# Patient Record
Sex: FEMALE | Race: WHITE
[De-identification: names, ages, dates, MRNs, and addresses within clinical notes are randomized per-mention and may not be internally consistent; named-entity substitution may affect disease eponyms.]

---

## 2020-02-21 ENCOUNTER — HOSPITAL ENCOUNTER (OUTPATIENT)
Dept: HOSPITAL 88 - OR | Age: 60
Discharge: HOME | End: 2020-02-21
Attending: INTERNAL MEDICINE
Payer: COMMERCIAL

## 2020-02-21 VITALS — SYSTOLIC BLOOD PRESSURE: 101 MMHG | DIASTOLIC BLOOD PRESSURE: 60 MMHG

## 2020-02-21 DIAGNOSIS — K64.8: ICD-10-CM

## 2020-02-21 DIAGNOSIS — J44.9: ICD-10-CM

## 2020-02-21 DIAGNOSIS — F17.200: ICD-10-CM

## 2020-02-21 DIAGNOSIS — D50.0: Primary | ICD-10-CM

## 2020-02-21 DIAGNOSIS — K44.9: ICD-10-CM

## 2020-02-21 DIAGNOSIS — K29.50: ICD-10-CM

## 2020-02-21 DIAGNOSIS — K21.9: ICD-10-CM

## 2020-02-21 DIAGNOSIS — R00.0: ICD-10-CM

## 2020-02-21 DIAGNOSIS — K57.30: ICD-10-CM

## 2020-02-21 DIAGNOSIS — I10: ICD-10-CM

## 2020-02-21 LAB
BASOPHILS # BLD AUTO: 0.1 10*3/UL (ref 0–0.1)
BASOPHILS NFR BLD AUTO: 0.6 % (ref 0–1)
DEPRECATED NEUTROPHILS # BLD AUTO: 11.2 10*3/UL (ref 2.1–6.9)
EOSINOPHIL # BLD AUTO: 0.1 10*3/UL (ref 0–0.4)
EOSINOPHIL NFR BLD AUTO: 0.7 % (ref 0–6)
ERYTHROCYTE [DISTWIDTH] IN CORD BLOOD: 15.3 % (ref 11.7–14.4)
HCT VFR BLD AUTO: 26.1 % (ref 34.2–44.1)
HGB BLD-MCNC: 8.1 G/DL (ref 12–16)
LYMPHOCYTES # BLD: 1.5 10*3/UL (ref 1–3.2)
LYMPHOCYTES NFR BLD AUTO: 10 % (ref 18–39.1)
LYMPHOCYTES NFR BLD MANUAL: 7 % (ref 19–48)
MCH RBC QN AUTO: 24 PG (ref 28–32)
MCHC RBC AUTO-ENTMCNC: 31 G/DL (ref 31–35)
MCV RBC AUTO: 77.4 FL (ref 81–99)
MONOCYTES # BLD AUTO: 1.9 10*3/UL (ref 0.2–0.8)
MONOCYTES NFR BLD AUTO: 13 % (ref 4.4–11.3)
MONOCYTES NFR BLD MANUAL: 13 % (ref 3.4–9)
NEUTS SEG NFR BLD AUTO: 75 % (ref 38.7–80)
NEUTS SEG NFR BLD MANUAL: 80 % (ref 40–74)
PLAT MORPH BLD: NORMAL
PLATELET # BLD AUTO: 666 X10E3/UL (ref 140–360)
PLATELET # BLD EST: (no result) 10*3/UL
RBC # BLD AUTO: 3.37 X10E6/UL (ref 3.6–5.1)
RBC MORPH BLD: NORMAL

## 2020-02-21 PROCEDURE — 43239 EGD BIOPSY SINGLE/MULTIPLE: CPT

## 2020-02-21 PROCEDURE — 36415 COLL VENOUS BLD VENIPUNCTURE: CPT

## 2020-02-21 PROCEDURE — 85025 COMPLETE CBC W/AUTO DIFF WBC: CPT

## 2020-02-21 PROCEDURE — 45378 DIAGNOSTIC COLONOSCOPY: CPT

## 2020-02-21 NOTE — XMS REPORT
Patient Summary Document

                             Created on: 2020



JILL SIDHU

External Reference #: [id number]

: 1960

Sex: Female



Demographics







                          Address                   -

[place] [address]

 

                          Home Phone                [phone]

 

                          Preferred Language        Unknown

 

                          Marital Status            Unknown

 

                          Adventism Affiliation     Unknown

 

                          Race                      Unknown

 

                          Ethnic Group              Unknown





Author







                          Author                    [facility]

 

                          [facility]

 

                          Address                   Unknown

 

                          Phone                     Unavailable







Support







                Name            Relationship    Address         Phone

 

                    ADILSON HUGHES      PRS                 [address] 

[place], [place]  [id number] [phone]

 

                    ADILSON HUGHES      PRS                 [address] 

[place], [place]  [address] [phone]

 

                    UNKN,  UNKN         PRS                 [address] 

[place], [place]  [address] [phone]







Care Team Providers







                    Care Team Member Name    Role                Phone

 

                    TIMOTHY ENGLAND    Unavailable         Unavailable







Payers







             Payer Name    Policy Type    Policy Number    Effective Date    Expiration Date







Problems

This patient has no known problems.



Allergies, Adverse Reactions, Alerts







          Allergy Name    Allergy Type    Status    Severity    Reaction(s)    Onset Date    Inactive 

Date                      Treating Clinician        Comments

 

        No Known Allergies    DA      Active    U               2018 00:00:00                     







Medications

This patient has no known medications.



Results







           Test Description    Test Time    Test Comments    Text Results    Atomic Results    Result

 Comments

 

                - XR NECK SOFT TISSUE    2019 12:38:00                     FAX: Timothy Guillaume MD

  [phone]    [place]:   St: [facility] FAX: Alyssa Ford MD  
[phone]   
------------------------------------------------------------------------------- 
Name:   JILL NICOLAS             [facility]                    :
1960  Age/S: 58/F           [address] [place] [address] Blvd         Unit #: 
[id number]      Loc: [place] [address]                 Phys: 
Alyssa Peck MD                                                 Acct: 
[id number] Dis Date:               Status: REG CLI                            
   PHONE #: [phone]     Exam Date:     2019                 
 FAX #: [phone]     Reason: M19.90. INFLAMMATORY ARTHRITIS.                
   EXAMS:                                               CPT CODE:      [id number]
XR NECK SOFT TISSUE                        35222                    Soft tissue 
neck 2 view               HISTORY: Inflammatory arthritis.  Pulmonary nodule.   
           No relevant comparison.               FINDINGS:               The 
epiglottis and aryepiglottic folds are normal.  There is no       prevertebral 
soft tissue swelling.  There is extensive degenerative       disc and 
degenerative facet change at the mid to low cervical spine.        Chronic 
findings at the lung apices are described on separate CT       chest.           
     IMPRESSION:         1.  No acute soft tissue neck abnormality.         2.  
Extensive mid to low cervical degenerative disc and degenerative         facet 
change.  This may reflect inflammatory arthritis given history.         3.  Lung
findings are described on separate CT chest.                   SL:01            
     ** Electronically Signed by MARIAN Fritz **          **          
   on 2019 at 1238              **                      Reported and 
signed by: Jostin Fritz M.D.                 CC: Timothy England MD; 
Alyssa Peck MD                                                              
                                   Technologist: RT Jaylin(MINOR)          
                       Trnscrd Date/Time/By: 2019 (1238) : By: Kristin   
        Orig Print D/T: S: 2019 (5884)                         PAGE  1    
                  Signed Report                                    

 

                - CT CHEST W/O CONTRAST    2019 12:22:00                      Name: JILL NICOLAS   

           [facility]                    : 1960 Age/S: 58  / F    
    [address] [place] [address]         Unit #: [id number]     Loc:               
[place] [address]                 Phys: Alyssa Peck MD                     
                           Acct: [id number]  Dis Date:               Status: 
REG CLI                                 PHONE #: [phone]     Exam Date: 
2019  09                     FAX #: [phone]      Reason: R91.1, 
SOLITARY PULMONARY NODULE.                   EXAMS:                             
                 CPT CODE:      [id number] CT CHEST W/O CONTRAST                 
    [address]                    PROCEDURE: CT CHEST WITHOUT CONTRAST               
INDICATION: Solitary pulmonary nodule.               COMPARISON: No relevant 
priors available.               TECHNIQUE: Helical imaging performed apices 
through the lung bases       with axial, sagittal and coronal reformations.     
 CT imaging performed at this location utilizes radiation dose       
optimization techniques which include one or more of the following:       -
Automated exposure control       -Adjustment of the mA and/or kV according to 
patient size       -Use of iterative reconstruction technique       CT Radiation
Dose .20 mGy-cm                IV CONTRAST: None.               FINDINGS:
       LUNGS: Interstitial process with areas of consolidation and associated   
   volume loss involving the majority of the left upper lobe.  Areas of       
cavitation are noted within the lung parenchyma left upper lobe.        
Subpleural opacity left lower lobe 10 mm in diameter.  This is on       axial 
image 154 series 3.  A few much smaller opacities 2 to 3 mm in       diameter 
noted in the superior segment left lower lobe.  Emphysematous       changes 
noted throughout the right upper lobe and a small amount of       chronic 
appearing fibrocalcific scarring anteriorly right upper lobe.        No pleural 
effusions.               MEDIASTINUM: There are a few very small 5 to 6 mm or 
less mediastinal       nodes.  Thoracic aorta normal in caliber.  Central airway
clear.               UPPER ABDOMEN: Visualized upper abdominal viscera are fariba
l.               MUSCULOSKELETAL: Diffuse osteopenia.  Bones intact.            
    IMPRESSION:          1.  Chronic appearing changes involving the majority of
the left upper         lobe with associated volume loss.  These are most likely 
       postinflammatory.  Less pronounced changes right upper lobe.         2.  
10 mm subpleural opacity left lower lobe laterally.  I have no         prior 
exams to establish stability.  Recommend comparison to prior         exams from 
another facility.         3.  Emphysematous change right upper lobe.         4. 
Osteopenia.               PAGE  1                       Signed Report           
        (CONTINUED)   Name: JILL NICOLAS              [facility]   
                : 1960 Age/S: 58  / F         [address] [place] [address]  
      Unit #: [id number]     Loc:               [place] [address]               
 Phys: Alyssa Peck MD                                                 Acct:
[id number]  Dis Date:               Status: REG CLI                           
     PHONE #: [phone]     Exam Date: 2019  09                     
FAX #: [phone]      Reason: R91.1, SOLITARY PULMONARY NODULE.              
    EXAMS:                                               CPT CODE:      
[id number] CT CHEST W/O CONTRAST                      [address]               
<Continued>                    *****END IMPRESSION*****                   
[id number]                                                           ** 
Electronically Signed by MARIAN Vásquez **           **             on 
2019 at 1222             **                      Reported and signed by: 
Suhail Vásquez M.D.                             CC: Timothy England MD; Alyssa Peck MD                                                                     
                            Technologist:Don Rob, RT(R); Sammy  CTDI:  
     DLP:        Trnscb Date/Time: 2019 (122) t.SDR.RTB                  
     Orig Print D/T: S: 2019 (7271)      PAGE  2                       
Signed Report                                    

 

                - XR SHOULDER 2 + V BI    2019 11:04:00                     FAX: Timothy Guillaume MD

  [phone]    [place]:   St: [facility] FAX: Alyssa Ford MD  
[phone]   
------------------------------------------------------------------------------- 
Name:   JILL NICOLAS             [facility]                    :
1960  Age/S: 58/F           [address] [place] [address] Blvd         Unit #: 
[id number]      Loc: [place] [address]                 Phys: 
Alyssa Peck MD                                                 Acct: 
[id number] Dis Date:               Status: REG CLI                            
   PHONE #: [phone]     Exam Date:     2019     0948                 
 FAX #: [phone]     Reason: M25.519 , SHOULDER PAIN.                       
   EXAMS:                                               CPT CODE:      [id number]
XR SHOULDER 2 + V BI                       14188                    BILATERAL 
SHOULDERS, 3 VIEWS EACH 2019               COMPARISON: None               
CLINICAL HISTORY: M25.519 , SHOULDER PAIN.               FINDINGS: No acute 
fracture or dislocation is noted.               No lytic or blastic lesion is 
seen.                No significant joint space narrowing noted.               
No abnormal soft tissue calcifications are seen within either       shoulder.   
           A few calcifications are seen in the region of the right carotid     
 artery.               Abnormal opacities with cystic changes are present in the
left upper       lobe with associated left apical pleural thickening. Minor 
right upper       lobe pleural and parenchymal scarring noted.               CO
NCLUSION: No acute osseous abnormality is seen in the shoulders.               
Abnormal left upper lobe opacities/cystic changes. Consider CT chest       for 
further assessment.          ** Electronically Signed by MARIAN Fleming on 
2019 at 1104 **                      Reported and signed by: Obed Fleming M.D.             CC: Timothy England MD; Alyssa Peck MD                     
                                                                            
Technologist: KARINA Mosqueda)                                  Trnscrd 
Date/Time/By: 2019 (9934) : By: [email or web address]          Orig Print D/T: S: 
2019 (3865)                         PAGE  1                       Signed 
Report                                    

 

                US RENAL DOPPLER LTD                                        [place] [address]      Patient Name: JILL EDUARDO   MR #: [id number]    : 1960 Age/Sex: 57/F  Acct #: [id number]
[id number] Req #: [id number]  Adm Physician:     Ordered by: TIMOTHY ENGLAND MD  Report #: [id number]   Location: US  Room/Bed:     
_____________________________________________________________________
______________________________    Procedure: [id number] US/US RENAL DOPPLER LTD  
Exam Date:                             Exam Time:        REPORT STATUS: Signed  
 PROCEDURE: RENAL DOPPLER ULTRASOUND       COMPARISON:   [facility], CT, CT CHEST WO, 2017, 8:19.     [facility], CT, 
OUTSIDE CT IMAGES, 2016, 9:46.       INDICATIONS:   HTN NOT CONTROLLED     
 FINDINGS:      Multiple sagittal and axial images of the right and left kidneys
were    obtained.       RIGHT KIDNEY:   The right kidney measures 9.0 cm. The 
cortical thickness is 1.1 cm.   The right kidney has normal echogenicity. There 
are no masses,    hydronephrosis or calculi.       The right renal artery PSV is
91.1, 33.8, 54.3 and 47.4 cm/sec at the    ostium, proximal, mid, and distal 
aspects, respectively.   The right segmental artery PSV is 44.7, 61.1 and 33.3 
in the superior,    mid, and inferior aspects, respectively cm/sec.       LEFT 
KIDNEY:   The left kidney measures 9.5 cm. The cortical thickness is 1.8 cm.   
The left kidney has normal echogenicity. There are no masses,    hydronephrosis 
or calculi.       The left renal artery PSV is 78.8, 42.4, 54.7 and 46.5 cm/sec 
at the    ostium, proximal, mid, and distal aspects, respectively.   The left 
segmental artery PSV is 31.5, 41.0 and 44.7 cm/sec at the    superior, mid, and 
inferior aspects, respectively.       The abdominal aorta PSV is 53.2, 39.7 and 
37.3 cm/sec at the proximal,    celiac/SMA and distal aspects of the 
respectively.   The right renal artery/aorta ratio is 1.7.   The left renal 
artery/aorta ratio is 1.5.       The right and left renal veins are patent.     
 The bladder is unremarkable. No focal lesions. Bilateral ureteral jets    are 
identified.       CONCLUSION:          1. Essentially unremarkable renal 
ultrasound.       2. No sonographic evidence for renal artery stenosis.         
 Shashi Espinal M.D.     Dictated by:  Shashi Espinal M.D. on 2017
at 14:24        Electronically approved by:  Shashi Espinal M.D. on 
2017 at    14:24                Dictated By: SHASHI ESPINAL MD  
Electronically Signed By: SHASHI ESPINAL MD on 17 142  Transcribed By: 
AMARA on 17 1424       COPY TO:   TIMOTHY ENGLAND MD             

 

                US RENAL RETROPERITONEAL COMP                                        [place] [address]      Patient Name: JILL EDUARDO   MR #: [id number]    : 1960 Age/Sex: 57/F  Acct #: [id number]
[id number] Req #: [id number]  Adm Physician:     Ordered by: TIMOTHY ENGLAND MD  Report #: [id number]   Location: US  Room/Bed:     
_____________________________________________________________________
______________________________    Procedure: [id number] US/US RENAL 
RETROPERITONEAL COMP  Exam Date:                             Exam Time:        
REPORT STATUS: Signed    PROCEDURE:   US RETROPERITONEAL ( KIDNEY ).   
COMPARISON:   [facility], CT, CT CHEST WO, 2017, 8:19.   
INDICATIONS:   HTN NOT CONTROLLED       CONCLUSION:      Please see previously 
dictated report under renal Doppler ultrasound    performed same date           
   Shashi Espinal M.D.     Dictated by:  Shashi Espinal M.D. on 
2017 at 14:24        Electronically approved by:  Shashi Espinal M.D. 
on 2017 at    14:24                Dictated By: SHASHI ESPINAL MD  
Electronically Signed By: SHASHI ESPINAL MD on 17 1424  Transcribed By: 
AMARA on 17       COPY TO:   TIMOTHY ENGLAND MD

## 2020-11-16 ENCOUNTER — HOSPITAL ENCOUNTER (INPATIENT)
Dept: HOSPITAL 88 - ER | Age: 60
LOS: 10 days | DRG: 640 | End: 2020-11-26
Attending: FAMILY MEDICINE | Admitting: FAMILY MEDICINE
Payer: COMMERCIAL

## 2020-11-16 VITALS — DIASTOLIC BLOOD PRESSURE: 54 MMHG | SYSTOLIC BLOOD PRESSURE: 106 MMHG

## 2020-11-16 VITALS — SYSTOLIC BLOOD PRESSURE: 106 MMHG | DIASTOLIC BLOOD PRESSURE: 54 MMHG

## 2020-11-16 VITALS — HEIGHT: 60 IN | WEIGHT: 69.44 LBS | BODY MASS INDEX: 13.63 KG/M2

## 2020-11-16 DIAGNOSIS — J96.21: ICD-10-CM

## 2020-11-16 DIAGNOSIS — R64: ICD-10-CM

## 2020-11-16 DIAGNOSIS — Z51.5: ICD-10-CM

## 2020-11-16 DIAGNOSIS — J44.0: ICD-10-CM

## 2020-11-16 DIAGNOSIS — K20.90: ICD-10-CM

## 2020-11-16 DIAGNOSIS — M89.49: ICD-10-CM

## 2020-11-16 DIAGNOSIS — J96.22: ICD-10-CM

## 2020-11-16 DIAGNOSIS — Z20.828: ICD-10-CM

## 2020-11-16 DIAGNOSIS — E87.6: ICD-10-CM

## 2020-11-16 DIAGNOSIS — J69.0: ICD-10-CM

## 2020-11-16 DIAGNOSIS — I46.9: ICD-10-CM

## 2020-11-16 DIAGNOSIS — E43: Primary | ICD-10-CM

## 2020-11-16 DIAGNOSIS — K94.29: ICD-10-CM

## 2020-11-16 DIAGNOSIS — A31.9: ICD-10-CM

## 2020-11-16 DIAGNOSIS — D62: ICD-10-CM

## 2020-11-16 DIAGNOSIS — J15.9: ICD-10-CM

## 2020-11-16 DIAGNOSIS — R62.7: ICD-10-CM

## 2020-11-16 LAB
ALBUMIN SERPL-MCNC: 3 G/DL (ref 3.5–5)
ALBUMIN/GLOB SERPL: 0.8 {RATIO} (ref 0.8–2)
ALP SERPL-CCNC: 85 IU/L (ref 40–150)
ALT SERPL-CCNC: 7 IU/L (ref 0–55)
ANION GAP SERPL CALC-SCNC: 11.2 MMOL/L (ref 8–16)
BASOPHILS # BLD AUTO: 0 10*3/UL (ref 0–0.1)
BASOPHILS NFR BLD AUTO: 0.5 % (ref 0–1)
BUN SERPL-MCNC: 9 MG/DL (ref 7–26)
BUN/CREAT SERPL: 15 (ref 6–25)
CALCIUM SERPL-MCNC: 8.9 MG/DL (ref 8.4–10.2)
CHLORIDE SERPL-SCNC: 88 MMOL/L (ref 98–107)
CO2 SERPL-SCNC: 27 MMOL/L (ref 22–29)
DEPRECATED APTT PLAS QN: 46.9 SECONDS (ref 23.8–35.5)
DEPRECATED INR PLAS: 1.05
DEPRECATED NEUTROPHILS # BLD AUTO: 5 10*3/UL (ref 2.1–6.9)
EGFRCR SERPLBLD CKD-EPI 2021: > 60 ML/MIN (ref 60–?)
EOSINOPHIL # BLD AUTO: 0.1 10*3/UL (ref 0–0.4)
EOSINOPHIL NFR BLD AUTO: 1.1 % (ref 0–6)
ERYTHROCYTE [DISTWIDTH] IN CORD BLOOD: 15 % (ref 11.7–14.4)
GLOBULIN PLAS-MCNC: 3.8 G/DL (ref 2.3–3.5)
GLUCOSE SERPLBLD-MCNC: 89 MG/DL (ref 74–118)
HCT VFR BLD AUTO: 30.2 % (ref 34.2–44.1)
HGB BLD-MCNC: 9.4 G/DL (ref 12–16)
LYMPHOCYTES # BLD: 1.6 10*3/UL (ref 1–3.2)
LYMPHOCYTES NFR BLD AUTO: 21.3 % (ref 18–39.1)
MAGNESIUM SERPL-MCNC: 1.9 MG/DL (ref 1.3–2.1)
MCH RBC QN AUTO: 24 PG (ref 28–32)
MCHC RBC AUTO-ENTMCNC: 31.1 G/DL (ref 31–35)
MCV RBC AUTO: 77.2 FL (ref 81–99)
MONOCYTES # BLD AUTO: 0.7 10*3/UL (ref 0.2–0.8)
MONOCYTES NFR BLD AUTO: 9.6 % (ref 4.4–11.3)
NEUTS SEG NFR BLD AUTO: 67.2 % (ref 38.7–80)
PLATELET # BLD AUTO: 248 X10E3/UL (ref 140–360)
POTASSIUM SERPL-SCNC: 4.2 MMOL/L (ref 3.5–5.1)
PROTHROMBIN TIME: 14.2 SECONDS (ref 11.9–14.5)
RBC # BLD AUTO: 3.91 X10E6/UL (ref 3.6–5.1)
SODIUM SERPL-SCNC: 122 MMOL/L (ref 136–145)

## 2020-11-16 PROCEDURE — 84466 ASSAY OF TRANSFERRIN: CPT

## 2020-11-16 PROCEDURE — 36569 INSJ PICC 5 YR+ W/O IMAGING: CPT

## 2020-11-16 PROCEDURE — 86920 COMPATIBILITY TEST SPIN: CPT

## 2020-11-16 PROCEDURE — 74177 CT ABD & PELVIS W/CONTRAST: CPT

## 2020-11-16 PROCEDURE — 92950 HEART/LUNG RESUSCITATION CPR: CPT

## 2020-11-16 PROCEDURE — 85025 COMPLETE CBC W/AUTO DIFF WBC: CPT

## 2020-11-16 PROCEDURE — 85730 THROMBOPLASTIN TIME PARTIAL: CPT

## 2020-11-16 PROCEDURE — 82948 REAGENT STRIP/BLOOD GLUCOSE: CPT

## 2020-11-16 PROCEDURE — 87040 BLOOD CULTURE FOR BACTERIA: CPT

## 2020-11-16 PROCEDURE — 94660 CPAP INITIATION&MGMT: CPT

## 2020-11-16 PROCEDURE — 43246 EGD PLACE GASTROSTOMY TUBE: CPT

## 2020-11-16 PROCEDURE — 84100 ASSAY OF PHOSPHORUS: CPT

## 2020-11-16 PROCEDURE — 43239 EGD BIOPSY SINGLE/MULTIPLE: CPT

## 2020-11-16 PROCEDURE — 86900 BLOOD TYPING SEROLOGIC ABO: CPT

## 2020-11-16 PROCEDURE — 87206 SMEAR FLUORESCENT/ACID STAI: CPT

## 2020-11-16 PROCEDURE — 36600 WITHDRAWAL OF ARTERIAL BLOOD: CPT

## 2020-11-16 PROCEDURE — 82805 BLOOD GASES W/O2 SATURATION: CPT

## 2020-11-16 PROCEDURE — 85379 FIBRIN DEGRADATION QUANT: CPT

## 2020-11-16 PROCEDURE — 85610 PROTHROMBIN TIME: CPT

## 2020-11-16 PROCEDURE — 83605 ASSAY OF LACTIC ACID: CPT

## 2020-11-16 PROCEDURE — 71260 CT THORAX DX C+: CPT

## 2020-11-16 PROCEDURE — 82607 VITAMIN B-12: CPT

## 2020-11-16 PROCEDURE — 83540 ASSAY OF IRON: CPT

## 2020-11-16 PROCEDURE — 71045 X-RAY EXAM CHEST 1 VIEW: CPT

## 2020-11-16 PROCEDURE — 94003 VENT MGMT INPAT SUBQ DAY: CPT

## 2020-11-16 PROCEDURE — 87493 C DIFF AMPLIFIED PROBE: CPT

## 2020-11-16 PROCEDURE — 84484 ASSAY OF TROPONIN QUANT: CPT

## 2020-11-16 PROCEDURE — 85045 AUTOMATED RETICULOCYTE COUNT: CPT

## 2020-11-16 PROCEDURE — 94640 AIRWAY INHALATION TREATMENT: CPT

## 2020-11-16 PROCEDURE — 94002 VENT MGMT INPAT INIT DAY: CPT

## 2020-11-16 PROCEDURE — 99251: CPT

## 2020-11-16 PROCEDURE — 97139 UNLISTED THERAPEUTIC PX: CPT

## 2020-11-16 PROCEDURE — 93306 TTE W/DOPPLER COMPLETE: CPT

## 2020-11-16 PROCEDURE — 94664 DEMO&/EVAL PT USE INHALER: CPT

## 2020-11-16 PROCEDURE — 80048 BASIC METABOLIC PNL TOTAL CA: CPT

## 2020-11-16 PROCEDURE — 82746 ASSAY OF FOLIC ACID SERUM: CPT

## 2020-11-16 PROCEDURE — 86850 RBC ANTIBODY SCREEN: CPT

## 2020-11-16 PROCEDURE — 81001 URINALYSIS AUTO W/SCOPE: CPT

## 2020-11-16 PROCEDURE — 82728 ASSAY OF FERRITIN: CPT

## 2020-11-16 PROCEDURE — 87116 MYCOBACTERIA CULTURE: CPT

## 2020-11-16 PROCEDURE — 36415 COLL VENOUS BLD VENIPUNCTURE: CPT

## 2020-11-16 PROCEDURE — 82553 CREATINE MB FRACTION: CPT

## 2020-11-16 PROCEDURE — 74018 RADEX ABDOMEN 1 VIEW: CPT

## 2020-11-16 PROCEDURE — 31500 INSERT EMERGENCY AIRWAY: CPT

## 2020-11-16 PROCEDURE — 80053 COMPREHEN METABOLIC PANEL: CPT

## 2020-11-16 PROCEDURE — 88305 TISSUE EXAM BY PATHOLOGIST: CPT

## 2020-11-16 PROCEDURE — 82550 ASSAY OF CK (CPK): CPT

## 2020-11-16 PROCEDURE — 83735 ASSAY OF MAGNESIUM: CPT

## 2020-11-16 PROCEDURE — 99284 EMERGENCY DEPT VISIT MOD MDM: CPT

## 2020-11-16 RX ADMIN — SODIUM CHLORIDE SCH MLS/HR: 9 INJECTION, SOLUTION INTRAVENOUS at 22:03

## 2020-11-16 RX ADMIN — CEFTRIAXONE SCH MLS/HR: 100 INJECTION, POWDER, FOR SOLUTION INTRAVENOUS at 20:13

## 2020-11-17 VITALS — DIASTOLIC BLOOD PRESSURE: 71 MMHG | SYSTOLIC BLOOD PRESSURE: 116 MMHG

## 2020-11-17 VITALS — SYSTOLIC BLOOD PRESSURE: 105 MMHG | DIASTOLIC BLOOD PRESSURE: 84 MMHG

## 2020-11-17 VITALS — SYSTOLIC BLOOD PRESSURE: 107 MMHG | DIASTOLIC BLOOD PRESSURE: 66 MMHG

## 2020-11-17 VITALS — SYSTOLIC BLOOD PRESSURE: 113 MMHG | DIASTOLIC BLOOD PRESSURE: 77 MMHG

## 2020-11-17 VITALS — DIASTOLIC BLOOD PRESSURE: 70 MMHG | SYSTOLIC BLOOD PRESSURE: 128 MMHG

## 2020-11-17 VITALS — DIASTOLIC BLOOD PRESSURE: 71 MMHG | SYSTOLIC BLOOD PRESSURE: 98 MMHG

## 2020-11-17 VITALS — DIASTOLIC BLOOD PRESSURE: 59 MMHG | SYSTOLIC BLOOD PRESSURE: 109 MMHG

## 2020-11-17 LAB
ALBUMIN SERPL-MCNC: 2.5 G/DL (ref 3.5–5)
ALBUMIN/GLOB SERPL: 0.8 {RATIO} (ref 0.8–2)
ALP SERPL-CCNC: 72 IU/L (ref 40–150)
ALT SERPL-CCNC: < 6 IU/L (ref 0–55)
ANION GAP SERPL CALC-SCNC: 10.8 MMOL/L (ref 8–16)
ANION GAP SERPL CALC-SCNC: 8.8 MMOL/L (ref 8–16)
BACTERIA URNS QL MICRO: (no result) /HPF
BASOPHILS # BLD AUTO: 0 10*3/UL (ref 0–0.1)
BASOPHILS # BLD AUTO: 0 10*3/UL (ref 0–0.1)
BASOPHILS NFR BLD AUTO: 0.1 % (ref 0–1)
BASOPHILS NFR BLD AUTO: 0.4 % (ref 0–1)
BILIRUB UR QL: NEGATIVE
BUN SERPL-MCNC: 10 MG/DL (ref 7–26)
BUN SERPL-MCNC: 8 MG/DL (ref 7–26)
BUN/CREAT SERPL: 16 (ref 6–25)
BUN/CREAT SERPL: 19 (ref 6–25)
CALCIUM SERPL-MCNC: 7.4 MG/DL (ref 8.4–10.2)
CALCIUM SERPL-MCNC: 8 MG/DL (ref 8.4–10.2)
CHLORIDE SERPL-SCNC: 108 MMOL/L (ref 98–107)
CHLORIDE SERPL-SCNC: 98 MMOL/L (ref 98–107)
CLARITY UR: CLEAR
CO2 BLDCOA CALC-SCNC: > 5 MMOL/L
CO2 SERPL-SCNC: 22 MMOL/L (ref 22–29)
CO2 SERPL-SCNC: 26 MMOL/L (ref 22–29)
COLOR UR: YELLOW
DEPRECATED NEUTROPHILS # BLD AUTO: 3.7 10*3/UL (ref 2.1–6.9)
DEPRECATED NEUTROPHILS # BLD AUTO: 6.9 10*3/UL (ref 2.1–6.9)
DEPRECATED RBC URNS MANUAL-ACNC: (no result) /HPF (ref 0–5)
EGFRCR SERPLBLD CKD-EPI 2021: > 60 ML/MIN (ref 60–?)
EGFRCR SERPLBLD CKD-EPI 2021: > 60 ML/MIN (ref 60–?)
EOSINOPHIL # BLD AUTO: 0 10*3/UL (ref 0–0.4)
EOSINOPHIL # BLD AUTO: 0.1 10*3/UL (ref 0–0.4)
EOSINOPHIL NFR BLD AUTO: 0.2 % (ref 0–6)
EOSINOPHIL NFR BLD AUTO: 1.1 % (ref 0–6)
EPI CELLS URNS QL MICRO: (no result) /LPF
ERYTHROCYTE [DISTWIDTH] IN CORD BLOOD: 14.9 % (ref 11.7–14.4)
ERYTHROCYTE [DISTWIDTH] IN CORD BLOOD: 15.3 % (ref 11.7–14.4)
FERRITIN SERPL-MCNC: 1785.57 NG/ML (ref 4.63–204)
GLOBULIN PLAS-MCNC: 3.3 G/DL (ref 2.3–3.5)
GLUCOSE SERPLBLD-MCNC: 27 MG/DL (ref 74–118)
GLUCOSE SERPLBLD-MCNC: 79 MG/DL (ref 74–118)
HCO3 BLDA-SCNC: 4 MMOL/L (ref 22–26)
HCT VFR BLD AUTO: 24.4 % (ref 34.2–44.1)
HCT VFR BLD AUTO: 26.5 % (ref 34.2–44.1)
HGB BLD-MCNC: 7.4 G/DL (ref 12–16)
HGB BLD-MCNC: 8.3 G/DL (ref 12–16)
IRON SATN MFR SERPL: 11 % (ref 15–50)
IRON SERPL-MCNC: 19 UG/DL (ref 50–170)
KETONES UR QL STRIP.AUTO: NEGATIVE
LEUKOCYTE ESTERASE UR QL STRIP.AUTO: NEGATIVE
LYMPHOCYTES # BLD: 0.7 10*3/UL (ref 1–3.2)
LYMPHOCYTES # BLD: 1.1 10*3/UL (ref 1–3.2)
LYMPHOCYTES NFR BLD AUTO: 19.6 % (ref 18–39.1)
LYMPHOCYTES NFR BLD AUTO: 8.1 % (ref 18–39.1)
MCH RBC QN AUTO: 24.3 PG (ref 28–32)
MCH RBC QN AUTO: 24.5 PG (ref 28–32)
MCHC RBC AUTO-ENTMCNC: 30.3 G/DL (ref 31–35)
MCHC RBC AUTO-ENTMCNC: 31.3 G/DL (ref 31–35)
MCV RBC AUTO: 77.5 FL (ref 81–99)
MCV RBC AUTO: 80.8 FL (ref 81–99)
MONOCYTES # BLD AUTO: 0.5 10*3/UL (ref 0.2–0.8)
MONOCYTES # BLD AUTO: 0.6 10*3/UL (ref 0.2–0.8)
MONOCYTES NFR BLD AUTO: 10.8 % (ref 4.4–11.3)
MONOCYTES NFR BLD AUTO: 5.8 % (ref 4.4–11.3)
NEUTS SEG NFR BLD AUTO: 67.7 % (ref 38.7–80)
NEUTS SEG NFR BLD AUTO: 84.8 % (ref 38.7–80)
NITRITE UR QL STRIP.AUTO: NEGATIVE
PCO2 BLDA: 16 MMHG (ref 35–45)
PCO2 BLDA: 55 MMHG (ref 80–105)
PH BLDA: 6.98 [PH] (ref 7.35–7.45)
PLATELET # BLD AUTO: 149 X10E3/UL (ref 140–360)
PLATELET # BLD AUTO: 215 X10E3/UL (ref 140–360)
POTASSIUM SERPL-SCNC: 3.8 MMOL/L (ref 3.5–5.1)
POTASSIUM SERPL-SCNC: 3.8 MMOL/L (ref 3.5–5.1)
PROT UR QL STRIP.AUTO: NEGATIVE
RBC # BLD AUTO: 3.02 X10E6/UL (ref 3.6–5.1)
RBC # BLD AUTO: 3.42 X10E6/UL (ref 3.6–5.1)
SAO2 % BLDA: 70 % (ref 95–98)
SODIUM SERPL-SCNC: 131 MMOL/L (ref 136–145)
SODIUM SERPL-SCNC: 135 MMOL/L (ref 136–145)
SP GR UR STRIP: 1.02 (ref 1.01–1.02)
TIBC SERPL-MCNC: 168 UG/DL (ref 261–478)
TRANSFERRIN SERPL-MCNC: 120 MG/DL (ref 180–382)
UROBILINOGEN UR STRIP-MCNC: 0.2 MG/DL (ref 0.2–1)
WBC #/AREA URNS HPF: (no result) /HPF (ref 0–5)

## 2020-11-17 PROCEDURE — 5A1935Z RESPIRATORY VENTILATION, LESS THAN 24 CONSECUTIVE HOURS: ICD-10-PCS | Performed by: INTERNAL MEDICINE

## 2020-11-17 PROCEDURE — 0DB98ZX EXCISION OF DUODENUM, VIA NATURAL OR ARTIFICIAL OPENING ENDOSCOPIC, DIAGNOSTIC: ICD-10-PCS | Performed by: INTERNAL MEDICINE

## 2020-11-17 PROCEDURE — 0BH17EZ INSERTION OF ENDOTRACHEAL AIRWAY INTO TRACHEA, VIA NATURAL OR ARTIFICIAL OPENING: ICD-10-PCS | Performed by: INTERNAL MEDICINE

## 2020-11-17 RX ADMIN — Medication PRN MG: at 09:54

## 2020-11-17 RX ADMIN — CEFTRIAXONE SCH MLS/HR: 100 INJECTION, POWDER, FOR SOLUTION INTRAVENOUS at 18:20

## 2020-11-17 RX ADMIN — SODIUM CHLORIDE SCH MLS/HR: 9 INJECTION, SOLUTION INTRAVENOUS at 05:53

## 2020-11-17 RX ADMIN — SODIUM CHLORIDE SCH MLS/HR: 9 INJECTION, SOLUTION INTRAVENOUS at 12:45

## 2020-11-17 RX ADMIN — SODIUM CHLORIDE SCH MG: 900 INJECTION INTRAVENOUS at 10:04

## 2020-11-18 VITALS — DIASTOLIC BLOOD PRESSURE: 63 MMHG | SYSTOLIC BLOOD PRESSURE: 117 MMHG

## 2020-11-18 VITALS — DIASTOLIC BLOOD PRESSURE: 48 MMHG | SYSTOLIC BLOOD PRESSURE: 81 MMHG

## 2020-11-18 VITALS — SYSTOLIC BLOOD PRESSURE: 89 MMHG | DIASTOLIC BLOOD PRESSURE: 61 MMHG

## 2020-11-18 VITALS — SYSTOLIC BLOOD PRESSURE: 71 MMHG | DIASTOLIC BLOOD PRESSURE: 47 MMHG

## 2020-11-18 VITALS — SYSTOLIC BLOOD PRESSURE: 104 MMHG | DIASTOLIC BLOOD PRESSURE: 71 MMHG

## 2020-11-18 VITALS — DIASTOLIC BLOOD PRESSURE: 66 MMHG | SYSTOLIC BLOOD PRESSURE: 114 MMHG

## 2020-11-18 VITALS — DIASTOLIC BLOOD PRESSURE: 58 MMHG | SYSTOLIC BLOOD PRESSURE: 95 MMHG

## 2020-11-18 VITALS — DIASTOLIC BLOOD PRESSURE: 56 MMHG | SYSTOLIC BLOOD PRESSURE: 102 MMHG

## 2020-11-18 VITALS — SYSTOLIC BLOOD PRESSURE: 89 MMHG | DIASTOLIC BLOOD PRESSURE: 67 MMHG

## 2020-11-18 VITALS — SYSTOLIC BLOOD PRESSURE: 117 MMHG | DIASTOLIC BLOOD PRESSURE: 60 MMHG

## 2020-11-18 VITALS — SYSTOLIC BLOOD PRESSURE: 109 MMHG | DIASTOLIC BLOOD PRESSURE: 71 MMHG

## 2020-11-18 VITALS — SYSTOLIC BLOOD PRESSURE: 95 MMHG | DIASTOLIC BLOOD PRESSURE: 58 MMHG

## 2020-11-18 VITALS — DIASTOLIC BLOOD PRESSURE: 64 MMHG | SYSTOLIC BLOOD PRESSURE: 123 MMHG

## 2020-11-18 VITALS — DIASTOLIC BLOOD PRESSURE: 58 MMHG | SYSTOLIC BLOOD PRESSURE: 82 MMHG

## 2020-11-18 VITALS — SYSTOLIC BLOOD PRESSURE: 120 MMHG | DIASTOLIC BLOOD PRESSURE: 77 MMHG

## 2020-11-18 VITALS — DIASTOLIC BLOOD PRESSURE: 68 MMHG | SYSTOLIC BLOOD PRESSURE: 115 MMHG

## 2020-11-18 VITALS — SYSTOLIC BLOOD PRESSURE: 117 MMHG | DIASTOLIC BLOOD PRESSURE: 68 MMHG

## 2020-11-18 VITALS — DIASTOLIC BLOOD PRESSURE: 62 MMHG | SYSTOLIC BLOOD PRESSURE: 120 MMHG

## 2020-11-18 VITALS — DIASTOLIC BLOOD PRESSURE: 61 MMHG | SYSTOLIC BLOOD PRESSURE: 111 MMHG

## 2020-11-18 VITALS — DIASTOLIC BLOOD PRESSURE: 66 MMHG | SYSTOLIC BLOOD PRESSURE: 119 MMHG

## 2020-11-18 VITALS — SYSTOLIC BLOOD PRESSURE: 112 MMHG | DIASTOLIC BLOOD PRESSURE: 67 MMHG

## 2020-11-18 VITALS — SYSTOLIC BLOOD PRESSURE: 113 MMHG | DIASTOLIC BLOOD PRESSURE: 66 MMHG

## 2020-11-18 VITALS — DIASTOLIC BLOOD PRESSURE: 77 MMHG | SYSTOLIC BLOOD PRESSURE: 101 MMHG

## 2020-11-18 VITALS — SYSTOLIC BLOOD PRESSURE: 111 MMHG | DIASTOLIC BLOOD PRESSURE: 70 MMHG

## 2020-11-18 VITALS — DIASTOLIC BLOOD PRESSURE: 63 MMHG | SYSTOLIC BLOOD PRESSURE: 99 MMHG

## 2020-11-18 VITALS — SYSTOLIC BLOOD PRESSURE: 98 MMHG | DIASTOLIC BLOOD PRESSURE: 54 MMHG

## 2020-11-18 LAB
ALBUMIN SERPL-MCNC: 2.7 G/DL (ref 3.5–5)
ALBUMIN/GLOB SERPL: 1.1 {RATIO} (ref 0.8–2)
ALP SERPL-CCNC: 147 IU/L (ref 40–150)
ALT SERPL-CCNC: 29 IU/L (ref 0–55)
ANION GAP SERPL CALC-SCNC: 11.2 MMOL/L (ref 8–16)
BASOPHILS # BLD AUTO: 0 10*3/UL (ref 0–0.1)
BASOPHILS NFR BLD AUTO: 0.1 % (ref 0–1)
BUN SERPL-MCNC: 9 MG/DL (ref 7–26)
BUN/CREAT SERPL: 17 (ref 6–25)
CALCIUM SERPL-MCNC: 7.6 MG/DL (ref 8.4–10.2)
CHLORIDE SERPL-SCNC: 105 MMOL/L (ref 98–107)
CK MB SERPL-MCNC: 4.2 NG/ML (ref 0–5)
CK MB SERPL-MCNC: 5.8 NG/ML (ref 0–5)
CK MB SERPL-MCNC: 6 NG/ML (ref 0–5)
CK SERPL-CCNC: 232 IU/L (ref 29–168)
CK SERPL-CCNC: 237 IU/L (ref 29–168)
CK SERPL-CCNC: 55 IU/L (ref 29–168)
CO2 BLDCOA CALC-SCNC: 33 MMOL/L
CO2 BLDCOA CALC-SCNC: 34 MMOL/L
CO2 BLDCOA CALC-SCNC: 35 MMOL/L
CO2 BLDCOA CALC-SCNC: 36 MMOL/L
CO2 SERPL-SCNC: 29 MMOL/L (ref 22–29)
DEPRECATED NEUTROPHILS # BLD AUTO: 5.9 10*3/UL (ref 2.1–6.9)
EGFRCR SERPLBLD CKD-EPI 2021: > 60 ML/MIN (ref 60–?)
EOSINOPHIL # BLD AUTO: 0 10*3/UL (ref 0–0.4)
EOSINOPHIL NFR BLD AUTO: 0.1 % (ref 0–6)
ERYTHROCYTE [DISTWIDTH] IN CORD BLOOD: 15 % (ref 11.7–14.4)
GLOBULIN PLAS-MCNC: 2.5 G/DL (ref 2.3–3.5)
GLUCOSE SERPLBLD-MCNC: 110 MG/DL (ref 74–118)
HCO3 BLDA-SCNC: 31 MMOL/L (ref 22–26)
HCO3 BLDA-SCNC: 31 MMOL/L (ref 22–26)
HCO3 BLDA-SCNC: 33 MMOL/L (ref 22–26)
HCO3 BLDA-SCNC: 34 MMOL/L (ref 22–26)
HCT VFR BLD AUTO: 23.3 % (ref 34.2–44.1)
HGB BLD-MCNC: 7.3 G/DL (ref 12–16)
LYMPHOCYTES # BLD: 0.6 10*3/UL (ref 1–3.2)
LYMPHOCYTES NFR BLD AUTO: 8.5 % (ref 18–39.1)
MCH RBC QN AUTO: 24.7 PG (ref 28–32)
MCHC RBC AUTO-ENTMCNC: 31.3 G/DL (ref 31–35)
MCV RBC AUTO: 79 FL (ref 81–99)
MONOCYTES # BLD AUTO: 0.5 10*3/UL (ref 0.2–0.8)
MONOCYTES NFR BLD AUTO: 6.8 % (ref 4.4–11.3)
NEUTS SEG NFR BLD AUTO: 84.1 % (ref 38.7–80)
PCO2 BLDA: 212 MMHG (ref 80–105)
PCO2 BLDA: 229 MMHG (ref 80–105)
PCO2 BLDA: 564 MMHG (ref 80–105)
PCO2 BLDA: 59 MMHG (ref 35–45)
PCO2 BLDA: 73 MMHG (ref 35–45)
PCO2 BLDA: 79 MMHG (ref 80–105)
PCO2 BLDA: 86 MMHG (ref 35–45)
PCO2 BLDA: 86 MMHG (ref 35–45)
PH BLDA: 7.17 [PH] (ref 7.35–7.45)
PH BLDA: 7.19 [PH] (ref 7.35–7.45)
PH BLDA: 7.27 [PH] (ref 7.35–7.45)
PH BLDA: 7.33 [PH] (ref 7.35–7.45)
PLATELET # BLD AUTO: 159 X10E3/UL (ref 140–360)
POTASSIUM SERPL-SCNC: 3.2 MMOL/L (ref 3.5–5.1)
RBC # BLD AUTO: 2.95 X10E6/UL (ref 3.6–5.1)
SAO2 % BLDA: 100 % (ref 95–98)
SAO2 % BLDA: 100 % (ref 95–98)
SAO2 % BLDA: 91 % (ref 95–98)
SAO2 % BLDA: 99 % (ref 95–98)
SODIUM SERPL-SCNC: 142 MMOL/L (ref 136–145)

## 2020-11-18 PROCEDURE — 02HV33Z INSERTION OF INFUSION DEVICE INTO SUPERIOR VENA CAVA, PERCUTANEOUS APPROACH: ICD-10-PCS

## 2020-11-18 PROCEDURE — 30243N1 TRANSFUSION OF NONAUTOLOGOUS RED BLOOD CELLS INTO CENTRAL VEIN, PERCUTANEOUS APPROACH: ICD-10-PCS | Performed by: FAMILY MEDICINE

## 2020-11-18 RX ADMIN — SODIUM CHLORIDE SCH MLS/HR: 9 INJECTION, SOLUTION INTRAVENOUS at 09:20

## 2020-11-18 RX ADMIN — IPRATROPIUM BROMIDE AND ALBUTEROL SULFATE SCH ML: .5; 2.5 SOLUTION RESPIRATORY (INHALATION) at 19:00

## 2020-11-18 RX ADMIN — CASTOR OIL AND BALSAM, PERU SCH GM: 788; 87 OINTMENT TOPICAL at 09:00

## 2020-11-18 RX ADMIN — TAZOBACTAM SODIUM AND PIPERACILLIN SODIUM SCH MLS/HR: 375; 3 INJECTION, SOLUTION INTRAVENOUS at 18:02

## 2020-11-18 RX ADMIN — SODIUM CHLORIDE SCH MG: 900 INJECTION INTRAVENOUS at 08:43

## 2020-11-18 RX ADMIN — DEXTROSE AND SODIUM CHLORIDE SCH MLS/HR: 5; 900 INJECTION, SOLUTION INTRAVENOUS at 13:50

## 2020-11-18 RX ADMIN — HEPARIN SODIUM SCH UNIT: 5000 INJECTION INTRAVENOUS; SUBCUTANEOUS at 21:08

## 2020-11-18 RX ADMIN — IPRATROPIUM BROMIDE AND ALBUTEROL SULFATE SCH ML: .5; 2.5 SOLUTION RESPIRATORY (INHALATION) at 23:48

## 2020-11-18 RX ADMIN — CASTOR OIL AND BALSAM, PERU SCH GM: 788; 87 OINTMENT TOPICAL at 09:19

## 2020-11-18 RX ADMIN — PROPOFOL PRN MLS/HR: 10 INJECTION, EMULSION INTRAVENOUS at 18:02

## 2020-11-18 RX ADMIN — TAZOBACTAM SODIUM AND PIPERACILLIN SODIUM SCH MLS/HR: 375; 3 INJECTION, SOLUTION INTRAVENOUS at 12:04

## 2020-11-18 RX ADMIN — DEXTROSE AND SODIUM CHLORIDE SCH MLS/HR: 5; 900 INJECTION, SOLUTION INTRAVENOUS at 02:48

## 2020-11-18 RX ADMIN — DEXTROSE AND SODIUM CHLORIDE SCH MLS/HR: 5; 900 INJECTION, SOLUTION INTRAVENOUS at 22:36

## 2020-11-19 VITALS — SYSTOLIC BLOOD PRESSURE: 131 MMHG | DIASTOLIC BLOOD PRESSURE: 74 MMHG

## 2020-11-19 VITALS — DIASTOLIC BLOOD PRESSURE: 67 MMHG | SYSTOLIC BLOOD PRESSURE: 117 MMHG

## 2020-11-19 VITALS — SYSTOLIC BLOOD PRESSURE: 114 MMHG | DIASTOLIC BLOOD PRESSURE: 60 MMHG

## 2020-11-19 VITALS — SYSTOLIC BLOOD PRESSURE: 103 MMHG | DIASTOLIC BLOOD PRESSURE: 55 MMHG

## 2020-11-19 VITALS — DIASTOLIC BLOOD PRESSURE: 68 MMHG | SYSTOLIC BLOOD PRESSURE: 122 MMHG

## 2020-11-19 VITALS — SYSTOLIC BLOOD PRESSURE: 110 MMHG | DIASTOLIC BLOOD PRESSURE: 61 MMHG

## 2020-11-19 VITALS — SYSTOLIC BLOOD PRESSURE: 120 MMHG | DIASTOLIC BLOOD PRESSURE: 66 MMHG

## 2020-11-19 VITALS — DIASTOLIC BLOOD PRESSURE: 59 MMHG | SYSTOLIC BLOOD PRESSURE: 101 MMHG

## 2020-11-19 VITALS — DIASTOLIC BLOOD PRESSURE: 66 MMHG | SYSTOLIC BLOOD PRESSURE: 113 MMHG

## 2020-11-19 VITALS — DIASTOLIC BLOOD PRESSURE: 62 MMHG | SYSTOLIC BLOOD PRESSURE: 108 MMHG

## 2020-11-19 VITALS — SYSTOLIC BLOOD PRESSURE: 124 MMHG | DIASTOLIC BLOOD PRESSURE: 75 MMHG

## 2020-11-19 VITALS — DIASTOLIC BLOOD PRESSURE: 61 MMHG | SYSTOLIC BLOOD PRESSURE: 110 MMHG

## 2020-11-19 VITALS — SYSTOLIC BLOOD PRESSURE: 84 MMHG | DIASTOLIC BLOOD PRESSURE: 52 MMHG

## 2020-11-19 VITALS — DIASTOLIC BLOOD PRESSURE: 71 MMHG | SYSTOLIC BLOOD PRESSURE: 121 MMHG

## 2020-11-19 VITALS — DIASTOLIC BLOOD PRESSURE: 66 MMHG | SYSTOLIC BLOOD PRESSURE: 122 MMHG

## 2020-11-19 VITALS — DIASTOLIC BLOOD PRESSURE: 63 MMHG | SYSTOLIC BLOOD PRESSURE: 121 MMHG

## 2020-11-19 VITALS — DIASTOLIC BLOOD PRESSURE: 61 MMHG | SYSTOLIC BLOOD PRESSURE: 103 MMHG

## 2020-11-19 VITALS — SYSTOLIC BLOOD PRESSURE: 100 MMHG | DIASTOLIC BLOOD PRESSURE: 79 MMHG

## 2020-11-19 VITALS — SYSTOLIC BLOOD PRESSURE: 110 MMHG | DIASTOLIC BLOOD PRESSURE: 58 MMHG

## 2020-11-19 VITALS — DIASTOLIC BLOOD PRESSURE: 53 MMHG | SYSTOLIC BLOOD PRESSURE: 101 MMHG

## 2020-11-19 VITALS — SYSTOLIC BLOOD PRESSURE: 121 MMHG | DIASTOLIC BLOOD PRESSURE: 68 MMHG

## 2020-11-19 VITALS — DIASTOLIC BLOOD PRESSURE: 58 MMHG | SYSTOLIC BLOOD PRESSURE: 111 MMHG

## 2020-11-19 VITALS — DIASTOLIC BLOOD PRESSURE: 64 MMHG | SYSTOLIC BLOOD PRESSURE: 113 MMHG

## 2020-11-19 VITALS — DIASTOLIC BLOOD PRESSURE: 60 MMHG | SYSTOLIC BLOOD PRESSURE: 112 MMHG

## 2020-11-19 VITALS — SYSTOLIC BLOOD PRESSURE: 96 MMHG | DIASTOLIC BLOOD PRESSURE: 56 MMHG

## 2020-11-19 VITALS — DIASTOLIC BLOOD PRESSURE: 64 MMHG | SYSTOLIC BLOOD PRESSURE: 103 MMHG

## 2020-11-19 VITALS — SYSTOLIC BLOOD PRESSURE: 109 MMHG | DIASTOLIC BLOOD PRESSURE: 59 MMHG

## 2020-11-19 VITALS — SYSTOLIC BLOOD PRESSURE: 97 MMHG | DIASTOLIC BLOOD PRESSURE: 57 MMHG

## 2020-11-19 VITALS — DIASTOLIC BLOOD PRESSURE: 63 MMHG | SYSTOLIC BLOOD PRESSURE: 110 MMHG

## 2020-11-19 VITALS — SYSTOLIC BLOOD PRESSURE: 93 MMHG | DIASTOLIC BLOOD PRESSURE: 56 MMHG

## 2020-11-19 VITALS — DIASTOLIC BLOOD PRESSURE: 53 MMHG | SYSTOLIC BLOOD PRESSURE: 89 MMHG

## 2020-11-19 VITALS — SYSTOLIC BLOOD PRESSURE: 101 MMHG | DIASTOLIC BLOOD PRESSURE: 55 MMHG

## 2020-11-19 VITALS — SYSTOLIC BLOOD PRESSURE: 89 MMHG | DIASTOLIC BLOOD PRESSURE: 53 MMHG

## 2020-11-19 VITALS — DIASTOLIC BLOOD PRESSURE: 58 MMHG | SYSTOLIC BLOOD PRESSURE: 107 MMHG

## 2020-11-19 VITALS — SYSTOLIC BLOOD PRESSURE: 110 MMHG | DIASTOLIC BLOOD PRESSURE: 65 MMHG

## 2020-11-19 VITALS — SYSTOLIC BLOOD PRESSURE: 89 MMHG | DIASTOLIC BLOOD PRESSURE: 56 MMHG

## 2020-11-19 VITALS — DIASTOLIC BLOOD PRESSURE: 74 MMHG | SYSTOLIC BLOOD PRESSURE: 131 MMHG

## 2020-11-19 VITALS — SYSTOLIC BLOOD PRESSURE: 114 MMHG | DIASTOLIC BLOOD PRESSURE: 63 MMHG

## 2020-11-19 VITALS — DIASTOLIC BLOOD PRESSURE: 55 MMHG | SYSTOLIC BLOOD PRESSURE: 107 MMHG

## 2020-11-19 VITALS — DIASTOLIC BLOOD PRESSURE: 50 MMHG | SYSTOLIC BLOOD PRESSURE: 113 MMHG

## 2020-11-19 LAB
ALBUMIN SERPL-MCNC: 2.2 G/DL (ref 3.5–5)
ALBUMIN/GLOB SERPL: 0.9 {RATIO} (ref 0.8–2)
ALP SERPL-CCNC: 93 IU/L (ref 40–150)
ALT SERPL-CCNC: 16 IU/L (ref 0–55)
ANION GAP SERPL CALC-SCNC: 9.5 MMOL/L (ref 8–16)
BASOPHILS # BLD AUTO: 0 10*3/UL (ref 0–0.1)
BASOPHILS NFR BLD AUTO: 0.2 % (ref 0–1)
BUN SERPL-MCNC: 5 MG/DL (ref 7–26)
BUN/CREAT SERPL: 9 (ref 6–25)
CALCIUM SERPL-MCNC: 7.6 MG/DL (ref 8.4–10.2)
CHLORIDE SERPL-SCNC: 109 MMOL/L (ref 98–107)
CO2 BLDCOA CALC-SCNC: 27 MMOL/L
CO2 SERPL-SCNC: 27 MMOL/L (ref 22–29)
DEPRECATED NEUTROPHILS # BLD AUTO: 3.3 10*3/UL (ref 2.1–6.9)
EGFRCR SERPLBLD CKD-EPI 2021: > 60 ML/MIN (ref 60–?)
EOSINOPHIL # BLD AUTO: 0 10*3/UL (ref 0–0.4)
EOSINOPHIL NFR BLD AUTO: 0.6 % (ref 0–6)
ERYTHROCYTE [DISTWIDTH] IN CORD BLOOD: 15.4 % (ref 11.7–14.4)
GLOBULIN PLAS-MCNC: 2.4 G/DL (ref 2.3–3.5)
GLUCOSE SERPLBLD-MCNC: 111 MG/DL (ref 74–118)
HCO3 BLDA-SCNC: 26 MMOL/L (ref 22–26)
HCT VFR BLD AUTO: 20.9 % (ref 34.2–44.1)
HGB BLD-MCNC: 6.3 G/DL (ref 12–16)
LYMPHOCYTES # BLD: 1 10*3/UL (ref 1–3.2)
LYMPHOCYTES NFR BLD AUTO: 20.7 % (ref 18–39.1)
MCH RBC QN AUTO: 23.9 PG (ref 28–32)
MCHC RBC AUTO-ENTMCNC: 30.1 G/DL (ref 31–35)
MCV RBC AUTO: 79.2 FL (ref 81–99)
MONOCYTES # BLD AUTO: 0.5 10*3/UL (ref 0.2–0.8)
MONOCYTES NFR BLD AUTO: 9.6 % (ref 4.4–11.3)
NEUTS SEG NFR BLD AUTO: 68.5 % (ref 38.7–80)
PCO2 BLDA: 239 MMHG (ref 80–105)
PCO2 BLDA: 42 MMHG (ref 35–45)
PH BLDA: 7.39 [PH] (ref 7.35–7.45)
PLATELET # BLD AUTO: 130 X10E3/UL (ref 140–360)
POTASSIUM SERPL-SCNC: 2.5 MMOL/L (ref 3.5–5.1)
RBC # BLD AUTO: 2.64 X10E6/UL (ref 3.6–5.1)
SAO2 % BLDA: 100 % (ref 95–98)
SODIUM SERPL-SCNC: 143 MMOL/L (ref 136–145)

## 2020-11-19 PROCEDURE — 5A1955Z RESPIRATORY VENTILATION, GREATER THAN 96 CONSECUTIVE HOURS: ICD-10-PCS | Performed by: INTERNAL MEDICINE

## 2020-11-19 PROCEDURE — 0BH18EZ INSERTION OF ENDOTRACHEAL AIRWAY INTO TRACHEA, VIA NATURAL OR ARTIFICIAL OPENING ENDOSCOPIC: ICD-10-PCS | Performed by: INTERNAL MEDICINE

## 2020-11-19 RX ADMIN — TAZOBACTAM SODIUM AND PIPERACILLIN SODIUM SCH MLS/HR: 375; 3 INJECTION, SOLUTION INTRAVENOUS at 17:46

## 2020-11-19 RX ADMIN — HEPARIN SODIUM SCH UNIT: 5000 INJECTION INTRAVENOUS; SUBCUTANEOUS at 20:45

## 2020-11-19 RX ADMIN — IPRATROPIUM BROMIDE AND ALBUTEROL SULFATE SCH ML: .5; 2.5 SOLUTION RESPIRATORY (INHALATION) at 19:30

## 2020-11-19 RX ADMIN — IPRATROPIUM BROMIDE AND ALBUTEROL SULFATE SCH ML: .5; 2.5 SOLUTION RESPIRATORY (INHALATION) at 11:00

## 2020-11-19 RX ADMIN — CASTOR OIL AND BALSAM, PERU SCH GM: 788; 87 OINTMENT TOPICAL at 12:05

## 2020-11-19 RX ADMIN — AZITHROMYCIN SCH MG: 250 TABLET, FILM COATED ORAL at 12:05

## 2020-11-19 RX ADMIN — IPRATROPIUM BROMIDE AND ALBUTEROL SULFATE SCH ML: .5; 2.5 SOLUTION RESPIRATORY (INHALATION) at 23:10

## 2020-11-19 RX ADMIN — SODIUM CHLORIDE SCH MLS/HR: 9 INJECTION, SOLUTION INTRAVENOUS at 10:43

## 2020-11-19 RX ADMIN — HEPARIN SODIUM SCH UNIT: 5000 INJECTION INTRAVENOUS; SUBCUTANEOUS at 10:46

## 2020-11-19 RX ADMIN — IPRATROPIUM BROMIDE AND ALBUTEROL SULFATE SCH ML: .5; 2.5 SOLUTION RESPIRATORY (INHALATION) at 07:00

## 2020-11-19 RX ADMIN — TAZOBACTAM SODIUM AND PIPERACILLIN SODIUM SCH MLS/HR: 375; 3 INJECTION, SOLUTION INTRAVENOUS at 12:19

## 2020-11-19 RX ADMIN — TAZOBACTAM SODIUM AND PIPERACILLIN SODIUM SCH MLS/HR: 375; 3 INJECTION, SOLUTION INTRAVENOUS at 05:36

## 2020-11-19 RX ADMIN — SODIUM CHLORIDE SCH MG: 900 INJECTION INTRAVENOUS at 09:38

## 2020-11-19 RX ADMIN — PROPOFOL PRN MLS/HR: 10 INJECTION, EMULSION INTRAVENOUS at 05:55

## 2020-11-19 RX ADMIN — ETHAMBUTOL HYDROCHLORIDE SCH MG: 400 TABLET, FILM COATED ORAL at 12:05

## 2020-11-19 RX ADMIN — IPRATROPIUM BROMIDE AND ALBUTEROL SULFATE SCH ML: .5; 2.5 SOLUTION RESPIRATORY (INHALATION) at 03:35

## 2020-11-19 RX ADMIN — IPRATROPIUM BROMIDE AND ALBUTEROL SULFATE SCH ML: .5; 2.5 SOLUTION RESPIRATORY (INHALATION) at 14:30

## 2020-11-19 RX ADMIN — DEXTROSE AND SODIUM CHLORIDE SCH MLS/HR: 5; 900 INJECTION, SOLUTION INTRAVENOUS at 17:46

## 2020-11-19 RX ADMIN — RIFAMPIN SCH MG: 300 CAPSULE ORAL at 12:05

## 2020-11-19 RX ADMIN — TAZOBACTAM SODIUM AND PIPERACILLIN SODIUM SCH MLS/HR: 375; 3 INJECTION, SOLUTION INTRAVENOUS at 23:45

## 2020-11-19 RX ADMIN — TAZOBACTAM SODIUM AND PIPERACILLIN SODIUM SCH MLS/HR: 375; 3 INJECTION, SOLUTION INTRAVENOUS at 00:35

## 2020-11-20 VITALS — SYSTOLIC BLOOD PRESSURE: 117 MMHG | DIASTOLIC BLOOD PRESSURE: 67 MMHG

## 2020-11-20 VITALS — SYSTOLIC BLOOD PRESSURE: 108 MMHG | DIASTOLIC BLOOD PRESSURE: 61 MMHG

## 2020-11-20 VITALS — SYSTOLIC BLOOD PRESSURE: 110 MMHG | DIASTOLIC BLOOD PRESSURE: 53 MMHG

## 2020-11-20 VITALS — SYSTOLIC BLOOD PRESSURE: 121 MMHG | DIASTOLIC BLOOD PRESSURE: 72 MMHG

## 2020-11-20 VITALS — SYSTOLIC BLOOD PRESSURE: 96 MMHG | DIASTOLIC BLOOD PRESSURE: 59 MMHG

## 2020-11-20 VITALS — SYSTOLIC BLOOD PRESSURE: 99 MMHG | DIASTOLIC BLOOD PRESSURE: 57 MMHG

## 2020-11-20 VITALS — SYSTOLIC BLOOD PRESSURE: 121 MMHG | DIASTOLIC BLOOD PRESSURE: 69 MMHG

## 2020-11-20 VITALS — DIASTOLIC BLOOD PRESSURE: 77 MMHG | SYSTOLIC BLOOD PRESSURE: 103 MMHG

## 2020-11-20 VITALS — DIASTOLIC BLOOD PRESSURE: 61 MMHG | SYSTOLIC BLOOD PRESSURE: 107 MMHG

## 2020-11-20 VITALS — SYSTOLIC BLOOD PRESSURE: 105 MMHG | DIASTOLIC BLOOD PRESSURE: 63 MMHG

## 2020-11-20 VITALS — DIASTOLIC BLOOD PRESSURE: 65 MMHG | SYSTOLIC BLOOD PRESSURE: 123 MMHG

## 2020-11-20 VITALS — SYSTOLIC BLOOD PRESSURE: 130 MMHG | DIASTOLIC BLOOD PRESSURE: 74 MMHG

## 2020-11-20 VITALS — SYSTOLIC BLOOD PRESSURE: 107 MMHG | DIASTOLIC BLOOD PRESSURE: 61 MMHG

## 2020-11-20 VITALS — DIASTOLIC BLOOD PRESSURE: 57 MMHG | SYSTOLIC BLOOD PRESSURE: 103 MMHG

## 2020-11-20 VITALS — SYSTOLIC BLOOD PRESSURE: 118 MMHG | DIASTOLIC BLOOD PRESSURE: 68 MMHG

## 2020-11-20 VITALS — SYSTOLIC BLOOD PRESSURE: 117 MMHG | DIASTOLIC BLOOD PRESSURE: 69 MMHG

## 2020-11-20 VITALS — DIASTOLIC BLOOD PRESSURE: 65 MMHG | SYSTOLIC BLOOD PRESSURE: 115 MMHG

## 2020-11-20 VITALS — SYSTOLIC BLOOD PRESSURE: 122 MMHG | DIASTOLIC BLOOD PRESSURE: 63 MMHG

## 2020-11-20 VITALS — DIASTOLIC BLOOD PRESSURE: 56 MMHG | SYSTOLIC BLOOD PRESSURE: 109 MMHG

## 2020-11-20 VITALS — DIASTOLIC BLOOD PRESSURE: 65 MMHG | SYSTOLIC BLOOD PRESSURE: 110 MMHG

## 2020-11-20 VITALS — DIASTOLIC BLOOD PRESSURE: 58 MMHG | SYSTOLIC BLOOD PRESSURE: 102 MMHG

## 2020-11-20 VITALS — SYSTOLIC BLOOD PRESSURE: 110 MMHG | DIASTOLIC BLOOD PRESSURE: 66 MMHG

## 2020-11-20 VITALS — SYSTOLIC BLOOD PRESSURE: 123 MMHG | DIASTOLIC BLOOD PRESSURE: 73 MMHG

## 2020-11-20 VITALS — SYSTOLIC BLOOD PRESSURE: 108 MMHG | DIASTOLIC BLOOD PRESSURE: 65 MMHG

## 2020-11-20 LAB
ALBUMIN SERPL-MCNC: 1.8 G/DL (ref 3.5–5)
ALBUMIN/GLOB SERPL: 0.7 {RATIO} (ref 0.8–2)
ALP SERPL-CCNC: 79 IU/L (ref 40–150)
ALT SERPL-CCNC: 11 IU/L (ref 0–55)
ANION GAP SERPL CALC-SCNC: 7.3 MMOL/L (ref 8–16)
BASOPHILS # BLD AUTO: 0 10*3/UL (ref 0–0.1)
BASOPHILS NFR BLD AUTO: 0.4 % (ref 0–1)
BUN SERPL-MCNC: 6 MG/DL (ref 7–26)
BUN/CREAT SERPL: 10 (ref 6–25)
CALCIUM SERPL-MCNC: 7 MG/DL (ref 8.4–10.2)
CHLORIDE SERPL-SCNC: 117 MMOL/L (ref 98–107)
CO2 SERPL-SCNC: 24 MMOL/L (ref 22–29)
DEPRECATED NEUTROPHILS # BLD AUTO: 4 10*3/UL (ref 2.1–6.9)
EGFRCR SERPLBLD CKD-EPI 2021: > 60 ML/MIN (ref 60–?)
EOSINOPHIL # BLD AUTO: 0.1 10*3/UL (ref 0–0.4)
EOSINOPHIL NFR BLD AUTO: 1.8 % (ref 0–6)
ERYTHROCYTE [DISTWIDTH] IN CORD BLOOD: 15.9 % (ref 11.7–14.4)
GLOBULIN PLAS-MCNC: 2.5 G/DL (ref 2.3–3.5)
GLUCOSE SERPLBLD-MCNC: 292 MG/DL (ref 74–118)
HCT VFR BLD AUTO: 30.8 % (ref 34.2–44.1)
HGB BLD-MCNC: 10 G/DL (ref 12–16)
LYMPHOCYTES # BLD: 0.9 10*3/UL (ref 1–3.2)
LYMPHOCYTES NFR BLD AUTO: 16.5 % (ref 18–39.1)
MCH RBC QN AUTO: 26.3 PG (ref 28–32)
MCHC RBC AUTO-ENTMCNC: 32.5 G/DL (ref 31–35)
MCV RBC AUTO: 81.1 FL (ref 81–99)
MONOCYTES # BLD AUTO: 0.5 10*3/UL (ref 0.2–0.8)
MONOCYTES NFR BLD AUTO: 8.6 % (ref 4.4–11.3)
NEUTS SEG NFR BLD AUTO: 72.3 % (ref 38.7–80)
PLATELET # BLD AUTO: 100 X10E3/UL (ref 140–360)
POTASSIUM SERPL-SCNC: 3.3 MMOL/L (ref 3.5–5.1)
RBC # BLD AUTO: 3.8 X10E6/UL (ref 3.6–5.1)
SODIUM SERPL-SCNC: 145 MMOL/L (ref 136–145)

## 2020-11-20 PROCEDURE — 0DH63UZ INSERTION OF FEEDING DEVICE INTO STOMACH, PERCUTANEOUS APPROACH: ICD-10-PCS | Performed by: INTERNAL MEDICINE

## 2020-11-20 PROCEDURE — 0DJ08ZZ INSPECTION OF UPPER INTESTINAL TRACT, VIA NATURAL OR ARTIFICIAL OPENING ENDOSCOPIC: ICD-10-PCS | Performed by: INTERNAL MEDICINE

## 2020-11-20 RX ADMIN — IPRATROPIUM BROMIDE AND ALBUTEROL SULFATE SCH ML: .5; 2.5 SOLUTION RESPIRATORY (INHALATION) at 19:27

## 2020-11-20 RX ADMIN — SODIUM CHLORIDE SCH MLS/HR: 9 INJECTION, SOLUTION INTRAVENOUS at 08:22

## 2020-11-20 RX ADMIN — IPRATROPIUM BROMIDE AND ALBUTEROL SULFATE SCH ML: .5; 2.5 SOLUTION RESPIRATORY (INHALATION) at 11:00

## 2020-11-20 RX ADMIN — ETHAMBUTOL HYDROCHLORIDE SCH MG: 400 TABLET, FILM COATED ORAL at 07:41

## 2020-11-20 RX ADMIN — PROPOFOL PRN MLS/HR: 10 INJECTION, EMULSION INTRAVENOUS at 21:00

## 2020-11-20 RX ADMIN — IPRATROPIUM BROMIDE AND ALBUTEROL SULFATE SCH ML: .5; 2.5 SOLUTION RESPIRATORY (INHALATION) at 03:50

## 2020-11-20 RX ADMIN — RIFAMPIN SCH MG: 300 CAPSULE ORAL at 07:41

## 2020-11-20 RX ADMIN — PROPOFOL PRN MLS/HR: 10 INJECTION, EMULSION INTRAVENOUS at 05:38

## 2020-11-20 RX ADMIN — TAZOBACTAM SODIUM AND PIPERACILLIN SODIUM SCH MLS/HR: 375; 3 INJECTION, SOLUTION INTRAVENOUS at 11:04

## 2020-11-20 RX ADMIN — TAZOBACTAM SODIUM AND PIPERACILLIN SODIUM SCH MLS/HR: 375; 3 INJECTION, SOLUTION INTRAVENOUS at 05:37

## 2020-11-20 RX ADMIN — CASTOR OIL AND BALSAM, PERU SCH GM: 788; 87 OINTMENT TOPICAL at 07:34

## 2020-11-20 RX ADMIN — TAZOBACTAM SODIUM AND PIPERACILLIN SODIUM SCH MLS/HR: 375; 3 INJECTION, SOLUTION INTRAVENOUS at 18:34

## 2020-11-20 RX ADMIN — IPRATROPIUM BROMIDE AND ALBUTEROL SULFATE SCH ML: .5; 2.5 SOLUTION RESPIRATORY (INHALATION) at 23:02

## 2020-11-20 RX ADMIN — IPRATROPIUM BROMIDE AND ALBUTEROL SULFATE SCH ML: .5; 2.5 SOLUTION RESPIRATORY (INHALATION) at 08:24

## 2020-11-20 RX ADMIN — AZITHROMYCIN SCH MG: 250 TABLET, FILM COATED ORAL at 07:41

## 2020-11-20 RX ADMIN — IPRATROPIUM BROMIDE AND ALBUTEROL SULFATE SCH ML: .5; 2.5 SOLUTION RESPIRATORY (INHALATION) at 15:00

## 2020-11-20 RX ADMIN — SODIUM CHLORIDE SCH MG: 900 INJECTION INTRAVENOUS at 07:41

## 2020-11-20 RX ADMIN — DEXTROSE AND SODIUM CHLORIDE SCH MLS/HR: 5; 900 INJECTION, SOLUTION INTRAVENOUS at 05:37

## 2020-11-20 RX ADMIN — HEPARIN SODIUM SCH UNIT: 5000 INJECTION INTRAVENOUS; SUBCUTANEOUS at 20:51

## 2020-11-20 RX ADMIN — HEPARIN SODIUM SCH UNIT: 5000 INJECTION INTRAVENOUS; SUBCUTANEOUS at 07:41

## 2020-11-21 VITALS — SYSTOLIC BLOOD PRESSURE: 118 MMHG | DIASTOLIC BLOOD PRESSURE: 77 MMHG

## 2020-11-21 VITALS — DIASTOLIC BLOOD PRESSURE: 66 MMHG | SYSTOLIC BLOOD PRESSURE: 107 MMHG

## 2020-11-21 VITALS — DIASTOLIC BLOOD PRESSURE: 70 MMHG | SYSTOLIC BLOOD PRESSURE: 127 MMHG

## 2020-11-21 VITALS — DIASTOLIC BLOOD PRESSURE: 70 MMHG | SYSTOLIC BLOOD PRESSURE: 124 MMHG

## 2020-11-21 VITALS — DIASTOLIC BLOOD PRESSURE: 69 MMHG | SYSTOLIC BLOOD PRESSURE: 116 MMHG

## 2020-11-21 VITALS — DIASTOLIC BLOOD PRESSURE: 63 MMHG | SYSTOLIC BLOOD PRESSURE: 109 MMHG

## 2020-11-21 VITALS — DIASTOLIC BLOOD PRESSURE: 67 MMHG | SYSTOLIC BLOOD PRESSURE: 109 MMHG

## 2020-11-21 VITALS — SYSTOLIC BLOOD PRESSURE: 106 MMHG | DIASTOLIC BLOOD PRESSURE: 63 MMHG

## 2020-11-21 VITALS — SYSTOLIC BLOOD PRESSURE: 140 MMHG | DIASTOLIC BLOOD PRESSURE: 74 MMHG

## 2020-11-21 VITALS — DIASTOLIC BLOOD PRESSURE: 75 MMHG | SYSTOLIC BLOOD PRESSURE: 121 MMHG

## 2020-11-21 VITALS — DIASTOLIC BLOOD PRESSURE: 71 MMHG | SYSTOLIC BLOOD PRESSURE: 126 MMHG

## 2020-11-21 VITALS — DIASTOLIC BLOOD PRESSURE: 59 MMHG | SYSTOLIC BLOOD PRESSURE: 122 MMHG

## 2020-11-21 VITALS — SYSTOLIC BLOOD PRESSURE: 131 MMHG | DIASTOLIC BLOOD PRESSURE: 73 MMHG

## 2020-11-21 VITALS — SYSTOLIC BLOOD PRESSURE: 105 MMHG | DIASTOLIC BLOOD PRESSURE: 69 MMHG

## 2020-11-21 VITALS — DIASTOLIC BLOOD PRESSURE: 67 MMHG | SYSTOLIC BLOOD PRESSURE: 111 MMHG

## 2020-11-21 VITALS — DIASTOLIC BLOOD PRESSURE: 70 MMHG | SYSTOLIC BLOOD PRESSURE: 117 MMHG

## 2020-11-21 VITALS — DIASTOLIC BLOOD PRESSURE: 69 MMHG | SYSTOLIC BLOOD PRESSURE: 118 MMHG

## 2020-11-21 VITALS — DIASTOLIC BLOOD PRESSURE: 69 MMHG | SYSTOLIC BLOOD PRESSURE: 107 MMHG

## 2020-11-21 VITALS — SYSTOLIC BLOOD PRESSURE: 107 MMHG | DIASTOLIC BLOOD PRESSURE: 67 MMHG

## 2020-11-21 VITALS — SYSTOLIC BLOOD PRESSURE: 116 MMHG | DIASTOLIC BLOOD PRESSURE: 72 MMHG

## 2020-11-21 VITALS — SYSTOLIC BLOOD PRESSURE: 113 MMHG | DIASTOLIC BLOOD PRESSURE: 71 MMHG

## 2020-11-21 VITALS — SYSTOLIC BLOOD PRESSURE: 109 MMHG | DIASTOLIC BLOOD PRESSURE: 63 MMHG

## 2020-11-21 VITALS — DIASTOLIC BLOOD PRESSURE: 74 MMHG | SYSTOLIC BLOOD PRESSURE: 124 MMHG

## 2020-11-21 VITALS — SYSTOLIC BLOOD PRESSURE: 126 MMHG | DIASTOLIC BLOOD PRESSURE: 66 MMHG

## 2020-11-21 LAB
ALBUMIN SERPL-MCNC: 1.8 G/DL (ref 3.5–5)
ALBUMIN/GLOB SERPL: 0.7 {RATIO} (ref 0.8–2)
ALP SERPL-CCNC: 78 IU/L (ref 40–150)
ALT SERPL-CCNC: 9 IU/L (ref 0–55)
ANION GAP SERPL CALC-SCNC: 8.8 MMOL/L (ref 8–16)
BASOPHILS # BLD AUTO: 0 10*3/UL (ref 0–0.1)
BASOPHILS NFR BLD AUTO: 0.4 % (ref 0–1)
BUN SERPL-MCNC: 11 MG/DL (ref 7–26)
BUN/CREAT SERPL: 21 (ref 6–25)
CALCIUM SERPL-MCNC: 7.5 MG/DL (ref 8.4–10.2)
CHLORIDE SERPL-SCNC: 114 MMOL/L (ref 98–107)
CO2 BLDCOA CALC-SCNC: 24 MMOL/L
CO2 SERPL-SCNC: 24 MMOL/L (ref 22–29)
DEPRECATED NEUTROPHILS # BLD AUTO: 3.5 10*3/UL (ref 2.1–6.9)
EGFRCR SERPLBLD CKD-EPI 2021: > 60 ML/MIN (ref 60–?)
EOSINOPHIL # BLD AUTO: 0.1 10*3/UL (ref 0–0.4)
EOSINOPHIL NFR BLD AUTO: 2 % (ref 0–6)
ERYTHROCYTE [DISTWIDTH] IN CORD BLOOD: 16.5 % (ref 11.7–14.4)
GLOBULIN PLAS-MCNC: 2.7 G/DL (ref 2.3–3.5)
GLUCOSE SERPLBLD-MCNC: 95 MG/DL (ref 74–118)
HCO3 BLDA-SCNC: 23 MMOL/L (ref 22–26)
HCT VFR BLD AUTO: 30.4 % (ref 34.2–44.1)
HGB BLD-MCNC: 9.7 G/DL (ref 12–16)
LYMPHOCYTES # BLD: 0.8 10*3/UL (ref 1–3.2)
LYMPHOCYTES NFR BLD AUTO: 17.1 % (ref 18–39.1)
MAGNESIUM SERPL-MCNC: 1.3 MG/DL (ref 1.3–2.1)
MCH RBC QN AUTO: 25.7 PG (ref 28–32)
MCHC RBC AUTO-ENTMCNC: 31.9 G/DL (ref 31–35)
MCV RBC AUTO: 80.4 FL (ref 81–99)
MONOCYTES # BLD AUTO: 0.4 10*3/UL (ref 0.2–0.8)
MONOCYTES NFR BLD AUTO: 9 % (ref 4.4–11.3)
NEUTS SEG NFR BLD AUTO: 71.1 % (ref 38.7–80)
PCO2 BLDA: 143 MMHG (ref 80–105)
PCO2 BLDA: 38 MMHG (ref 35–45)
PH BLDA: 7.39 [PH] (ref 7.35–7.45)
PLATELET # BLD AUTO: 95 X10E3/UL (ref 140–360)
POTASSIUM SERPL-SCNC: 2.8 MMOL/L (ref 3.5–5.1)
RBC # BLD AUTO: 3.78 X10E6/UL (ref 3.6–5.1)
SAO2 % BLDA: 99 % (ref 95–98)
SODIUM SERPL-SCNC: 144 MMOL/L (ref 136–145)

## 2020-11-21 RX ADMIN — RIFAMPIN SCH MG: 300 CAPSULE ORAL at 10:44

## 2020-11-21 RX ADMIN — SODIUM CHLORIDE SCH MG: 900 INJECTION INTRAVENOUS at 10:44

## 2020-11-21 RX ADMIN — IPRATROPIUM BROMIDE AND ALBUTEROL SULFATE SCH ML: .5; 2.5 SOLUTION RESPIRATORY (INHALATION) at 03:20

## 2020-11-21 RX ADMIN — HEPARIN SODIUM SCH UNIT: 5000 INJECTION INTRAVENOUS; SUBCUTANEOUS at 10:45

## 2020-11-21 RX ADMIN — IPRATROPIUM BROMIDE AND ALBUTEROL SULFATE SCH ML: .5; 2.5 SOLUTION RESPIRATORY (INHALATION) at 19:35

## 2020-11-21 RX ADMIN — Medication PRN MG: at 13:13

## 2020-11-21 RX ADMIN — ETHAMBUTOL HYDROCHLORIDE SCH MG: 400 TABLET, FILM COATED ORAL at 10:44

## 2020-11-21 RX ADMIN — IPRATROPIUM BROMIDE AND ALBUTEROL SULFATE SCH ML: .5; 2.5 SOLUTION RESPIRATORY (INHALATION) at 07:00

## 2020-11-21 RX ADMIN — Medication PRN MG: at 17:11

## 2020-11-21 RX ADMIN — AZITHROMYCIN SCH MG: 250 TABLET, FILM COATED ORAL at 10:44

## 2020-11-21 RX ADMIN — Medication PRN MG: at 15:30

## 2020-11-21 RX ADMIN — HEPARIN SODIUM SCH UNIT: 5000 INJECTION INTRAVENOUS; SUBCUTANEOUS at 21:54

## 2020-11-21 RX ADMIN — CASTOR OIL AND BALSAM, PERU SCH GM: 788; 87 OINTMENT TOPICAL at 10:45

## 2020-11-21 RX ADMIN — IPRATROPIUM BROMIDE AND ALBUTEROL SULFATE SCH ML: .5; 2.5 SOLUTION RESPIRATORY (INHALATION) at 11:00

## 2020-11-21 RX ADMIN — TAZOBACTAM SODIUM AND PIPERACILLIN SODIUM SCH MLS/HR: 375; 3 INJECTION, SOLUTION INTRAVENOUS at 01:08

## 2020-11-21 RX ADMIN — TAZOBACTAM SODIUM AND PIPERACILLIN SODIUM SCH MLS/HR: 375; 3 INJECTION, SOLUTION INTRAVENOUS at 18:00

## 2020-11-21 RX ADMIN — TAZOBACTAM SODIUM AND PIPERACILLIN SODIUM SCH MLS/HR: 375; 3 INJECTION, SOLUTION INTRAVENOUS at 13:10

## 2020-11-21 RX ADMIN — SODIUM CHLORIDE SCH MLS/HR: 9 INJECTION, SOLUTION INTRAVENOUS at 10:57

## 2020-11-21 RX ADMIN — TAZOBACTAM SODIUM AND PIPERACILLIN SODIUM SCH MLS/HR: 375; 3 INJECTION, SOLUTION INTRAVENOUS at 06:23

## 2020-11-22 VITALS — DIASTOLIC BLOOD PRESSURE: 74 MMHG | SYSTOLIC BLOOD PRESSURE: 134 MMHG

## 2020-11-22 VITALS — SYSTOLIC BLOOD PRESSURE: 127 MMHG | DIASTOLIC BLOOD PRESSURE: 86 MMHG

## 2020-11-22 VITALS — SYSTOLIC BLOOD PRESSURE: 114 MMHG | DIASTOLIC BLOOD PRESSURE: 76 MMHG

## 2020-11-22 VITALS — DIASTOLIC BLOOD PRESSURE: 76 MMHG | SYSTOLIC BLOOD PRESSURE: 118 MMHG

## 2020-11-22 VITALS — SYSTOLIC BLOOD PRESSURE: 109 MMHG | DIASTOLIC BLOOD PRESSURE: 65 MMHG

## 2020-11-22 VITALS — DIASTOLIC BLOOD PRESSURE: 75 MMHG | SYSTOLIC BLOOD PRESSURE: 133 MMHG

## 2020-11-22 VITALS — SYSTOLIC BLOOD PRESSURE: 116 MMHG | DIASTOLIC BLOOD PRESSURE: 71 MMHG

## 2020-11-22 VITALS — DIASTOLIC BLOOD PRESSURE: 88 MMHG | SYSTOLIC BLOOD PRESSURE: 106 MMHG

## 2020-11-22 VITALS — DIASTOLIC BLOOD PRESSURE: 77 MMHG | SYSTOLIC BLOOD PRESSURE: 132 MMHG

## 2020-11-22 VITALS — DIASTOLIC BLOOD PRESSURE: 68 MMHG | SYSTOLIC BLOOD PRESSURE: 112 MMHG

## 2020-11-22 VITALS — SYSTOLIC BLOOD PRESSURE: 118 MMHG | DIASTOLIC BLOOD PRESSURE: 76 MMHG

## 2020-11-22 VITALS — SYSTOLIC BLOOD PRESSURE: 123 MMHG | DIASTOLIC BLOOD PRESSURE: 65 MMHG

## 2020-11-22 VITALS — DIASTOLIC BLOOD PRESSURE: 76 MMHG | SYSTOLIC BLOOD PRESSURE: 129 MMHG

## 2020-11-22 LAB
ALBUMIN SERPL-MCNC: 1.8 G/DL (ref 3.5–5)
ALBUMIN/GLOB SERPL: 0.6 {RATIO} (ref 0.8–2)
ALP SERPL-CCNC: 76 IU/L (ref 40–150)
ALT SERPL-CCNC: 8 IU/L (ref 0–55)
ANION GAP SERPL CALC-SCNC: 8.9 MMOL/L (ref 8–16)
BUN SERPL-MCNC: 11 MG/DL (ref 7–26)
BUN/CREAT SERPL: 21 (ref 6–25)
CALCIUM SERPL-MCNC: 7.9 MG/DL (ref 8.4–10.2)
CHLORIDE SERPL-SCNC: 114 MMOL/L (ref 98–107)
CO2 SERPL-SCNC: 25 MMOL/L (ref 22–29)
EGFRCR SERPLBLD CKD-EPI 2021: > 60 ML/MIN (ref 60–?)
GLOBULIN PLAS-MCNC: 3.1 G/DL (ref 2.3–3.5)
GLUCOSE SERPLBLD-MCNC: 99 MG/DL (ref 74–118)
MAGNESIUM SERPL-MCNC: 1.4 MG/DL (ref 1.3–2.1)
POTASSIUM SERPL-SCNC: 3.9 MMOL/L (ref 3.5–5.1)
SODIUM SERPL-SCNC: 144 MMOL/L (ref 136–145)

## 2020-11-22 RX ADMIN — HEPARIN SODIUM SCH UNIT: 5000 INJECTION INTRAVENOUS; SUBCUTANEOUS at 08:52

## 2020-11-22 RX ADMIN — TAZOBACTAM SODIUM AND PIPERACILLIN SODIUM SCH MLS/HR: 375; 3 INJECTION, SOLUTION INTRAVENOUS at 00:50

## 2020-11-22 RX ADMIN — AZITHROMYCIN SCH MG: 250 TABLET, FILM COATED ORAL at 08:51

## 2020-11-22 RX ADMIN — SODIUM CHLORIDE SCH MLS/HR: 9 INJECTION, SOLUTION INTRAVENOUS at 08:52

## 2020-11-22 RX ADMIN — TAZOBACTAM SODIUM AND PIPERACILLIN SODIUM SCH MLS/HR: 375; 3 INJECTION, SOLUTION INTRAVENOUS at 17:22

## 2020-11-22 RX ADMIN — CASTOR OIL AND BALSAM, PERU SCH GM: 788; 87 OINTMENT TOPICAL at 08:52

## 2020-11-22 RX ADMIN — IPRATROPIUM BROMIDE AND ALBUTEROL SULFATE SCH ML: .5; 2.5 SOLUTION RESPIRATORY (INHALATION) at 07:00

## 2020-11-22 RX ADMIN — IPRATROPIUM BROMIDE AND ALBUTEROL SULFATE SCH ML: .5; 2.5 SOLUTION RESPIRATORY (INHALATION) at 15:20

## 2020-11-22 RX ADMIN — TAZOBACTAM SODIUM AND PIPERACILLIN SODIUM SCH MLS/HR: 375; 3 INJECTION, SOLUTION INTRAVENOUS at 06:16

## 2020-11-22 RX ADMIN — RIFAMPIN SCH MG: 300 CAPSULE ORAL at 08:51

## 2020-11-22 RX ADMIN — ETHAMBUTOL HYDROCHLORIDE SCH MG: 400 TABLET, FILM COATED ORAL at 08:51

## 2020-11-22 RX ADMIN — TAZOBACTAM SODIUM AND PIPERACILLIN SODIUM SCH MLS/HR: 375; 3 INJECTION, SOLUTION INTRAVENOUS at 11:44

## 2020-11-22 RX ADMIN — SODIUM CHLORIDE SCH MG: 900 INJECTION INTRAVENOUS at 08:51

## 2020-11-22 RX ADMIN — IPRATROPIUM BROMIDE AND ALBUTEROL SULFATE SCH ML: .5; 2.5 SOLUTION RESPIRATORY (INHALATION) at 02:45

## 2020-11-22 RX ADMIN — IPRATROPIUM BROMIDE AND ALBUTEROL SULFATE SCH ML: .5; 2.5 SOLUTION RESPIRATORY (INHALATION) at 19:28

## 2020-11-22 RX ADMIN — IPRATROPIUM BROMIDE AND ALBUTEROL SULFATE SCH ML: .5; 2.5 SOLUTION RESPIRATORY (INHALATION) at 16:58

## 2020-11-22 RX ADMIN — HEPARIN SODIUM SCH UNIT: 5000 INJECTION INTRAVENOUS; SUBCUTANEOUS at 21:00

## 2020-11-23 VITALS — DIASTOLIC BLOOD PRESSURE: 80 MMHG | SYSTOLIC BLOOD PRESSURE: 119 MMHG

## 2020-11-23 VITALS — SYSTOLIC BLOOD PRESSURE: 140 MMHG | DIASTOLIC BLOOD PRESSURE: 81 MMHG

## 2020-11-23 VITALS — SYSTOLIC BLOOD PRESSURE: 139 MMHG | DIASTOLIC BLOOD PRESSURE: 81 MMHG

## 2020-11-23 VITALS — SYSTOLIC BLOOD PRESSURE: 114 MMHG | DIASTOLIC BLOOD PRESSURE: 55 MMHG

## 2020-11-23 VITALS — SYSTOLIC BLOOD PRESSURE: 128 MMHG | DIASTOLIC BLOOD PRESSURE: 73 MMHG

## 2020-11-23 VITALS — DIASTOLIC BLOOD PRESSURE: 51 MMHG | SYSTOLIC BLOOD PRESSURE: 105 MMHG

## 2020-11-23 VITALS — DIASTOLIC BLOOD PRESSURE: 81 MMHG | SYSTOLIC BLOOD PRESSURE: 131 MMHG

## 2020-11-23 VITALS — SYSTOLIC BLOOD PRESSURE: 126 MMHG | DIASTOLIC BLOOD PRESSURE: 80 MMHG

## 2020-11-23 VITALS — DIASTOLIC BLOOD PRESSURE: 73 MMHG | SYSTOLIC BLOOD PRESSURE: 128 MMHG

## 2020-11-23 VITALS — SYSTOLIC BLOOD PRESSURE: 107 MMHG | DIASTOLIC BLOOD PRESSURE: 58 MMHG

## 2020-11-23 VITALS — DIASTOLIC BLOOD PRESSURE: 66 MMHG | SYSTOLIC BLOOD PRESSURE: 104 MMHG

## 2020-11-23 VITALS — SYSTOLIC BLOOD PRESSURE: 119 MMHG | DIASTOLIC BLOOD PRESSURE: 76 MMHG

## 2020-11-23 VITALS — SYSTOLIC BLOOD PRESSURE: 124 MMHG | DIASTOLIC BLOOD PRESSURE: 80 MMHG

## 2020-11-23 VITALS — SYSTOLIC BLOOD PRESSURE: 108 MMHG | DIASTOLIC BLOOD PRESSURE: 57 MMHG

## 2020-11-23 VITALS — DIASTOLIC BLOOD PRESSURE: 60 MMHG | SYSTOLIC BLOOD PRESSURE: 103 MMHG

## 2020-11-23 VITALS — SYSTOLIC BLOOD PRESSURE: 137 MMHG | DIASTOLIC BLOOD PRESSURE: 74 MMHG

## 2020-11-23 VITALS — DIASTOLIC BLOOD PRESSURE: 76 MMHG | SYSTOLIC BLOOD PRESSURE: 123 MMHG

## 2020-11-23 VITALS — SYSTOLIC BLOOD PRESSURE: 123 MMHG | DIASTOLIC BLOOD PRESSURE: 68 MMHG

## 2020-11-23 VITALS — SYSTOLIC BLOOD PRESSURE: 108 MMHG | DIASTOLIC BLOOD PRESSURE: 68 MMHG

## 2020-11-23 VITALS — DIASTOLIC BLOOD PRESSURE: 72 MMHG | SYSTOLIC BLOOD PRESSURE: 138 MMHG

## 2020-11-23 VITALS — DIASTOLIC BLOOD PRESSURE: 64 MMHG | SYSTOLIC BLOOD PRESSURE: 110 MMHG

## 2020-11-23 VITALS — SYSTOLIC BLOOD PRESSURE: 136 MMHG | DIASTOLIC BLOOD PRESSURE: 80 MMHG

## 2020-11-23 VITALS — DIASTOLIC BLOOD PRESSURE: 65 MMHG | SYSTOLIC BLOOD PRESSURE: 109 MMHG

## 2020-11-23 VITALS — SYSTOLIC BLOOD PRESSURE: 117 MMHG | DIASTOLIC BLOOD PRESSURE: 65 MMHG

## 2020-11-23 VITALS — SYSTOLIC BLOOD PRESSURE: 112 MMHG | DIASTOLIC BLOOD PRESSURE: 58 MMHG

## 2020-11-23 LAB
ALBUMIN SERPL-MCNC: 1.8 G/DL (ref 3.5–5)
ALBUMIN/GLOB SERPL: 0.6 {RATIO} (ref 0.8–2)
ALP SERPL-CCNC: 76 IU/L (ref 40–150)
ALT SERPL-CCNC: 8 IU/L (ref 0–55)
ANION GAP SERPL CALC-SCNC: 10.6 MMOL/L (ref 8–16)
BUN SERPL-MCNC: 11 MG/DL (ref 7–26)
BUN/CREAT SERPL: 22 (ref 6–25)
CALCIUM SERPL-MCNC: 8 MG/DL (ref 8.4–10.2)
CHLORIDE SERPL-SCNC: 110 MMOL/L (ref 98–107)
CO2 BLDCOA CALC-SCNC: 28 MMOL/L
CO2 SERPL-SCNC: 25 MMOL/L (ref 22–29)
EGFRCR SERPLBLD CKD-EPI 2021: > 60 ML/MIN (ref 60–?)
GLOBULIN PLAS-MCNC: 3.1 G/DL (ref 2.3–3.5)
GLUCOSE SERPLBLD-MCNC: 95 MG/DL (ref 74–118)
HCO3 BLDA-SCNC: 26 MMOL/L (ref 22–26)
PCO2 BLDA: 191 MMHG (ref 80–105)
PCO2 BLDA: 52 MMHG (ref 35–45)
PH BLDA: 7.31 [PH] (ref 7.35–7.45)
POTASSIUM SERPL-SCNC: 3.6 MMOL/L (ref 3.5–5.1)
SAO2 % BLDA: 100 % (ref 95–98)
SODIUM SERPL-SCNC: 142 MMOL/L (ref 136–145)

## 2020-11-23 RX ADMIN — IPRATROPIUM BROMIDE AND ALBUTEROL SULFATE SCH ML: .5; 2.5 SOLUTION RESPIRATORY (INHALATION) at 19:25

## 2020-11-23 RX ADMIN — TAZOBACTAM SODIUM AND PIPERACILLIN SODIUM SCH MLS/HR: 375; 3 INJECTION, SOLUTION INTRAVENOUS at 05:45

## 2020-11-23 RX ADMIN — AZITHROMYCIN SCH MG: 250 TABLET, FILM COATED ORAL at 08:00

## 2020-11-23 RX ADMIN — ETHAMBUTOL HYDROCHLORIDE SCH MG: 400 TABLET, FILM COATED ORAL at 08:00

## 2020-11-23 RX ADMIN — SODIUM CHLORIDE SCH MG: 900 INJECTION INTRAVENOUS at 08:00

## 2020-11-23 RX ADMIN — SODIUM CHLORIDE SCH MLS/HR: 9 INJECTION, SOLUTION INTRAVENOUS at 11:00

## 2020-11-23 RX ADMIN — PROPOFOL PRN MLS/HR: 10 INJECTION, EMULSION INTRAVENOUS at 15:33

## 2020-11-23 RX ADMIN — IPRATROPIUM BROMIDE AND ALBUTEROL SULFATE SCH ML: .5; 2.5 SOLUTION RESPIRATORY (INHALATION) at 07:20

## 2020-11-23 RX ADMIN — IPRATROPIUM BROMIDE AND ALBUTEROL SULFATE SCH ML: .5; 2.5 SOLUTION RESPIRATORY (INHALATION) at 02:46

## 2020-11-23 RX ADMIN — HEPARIN SODIUM SCH UNIT: 5000 INJECTION INTRAVENOUS; SUBCUTANEOUS at 08:01

## 2020-11-23 RX ADMIN — TAZOBACTAM SODIUM AND PIPERACILLIN SODIUM SCH MLS/HR: 375; 3 INJECTION, SOLUTION INTRAVENOUS at 00:00

## 2020-11-23 RX ADMIN — IPRATROPIUM BROMIDE AND ALBUTEROL SULFATE SCH ML: .5; 2.5 SOLUTION RESPIRATORY (INHALATION) at 13:40

## 2020-11-23 RX ADMIN — RIFAMPIN SCH MG: 300 CAPSULE ORAL at 08:00

## 2020-11-23 RX ADMIN — CASTOR OIL AND BALSAM, PERU SCH GM: 788; 87 OINTMENT TOPICAL at 08:00

## 2020-11-24 VITALS — SYSTOLIC BLOOD PRESSURE: 115 MMHG | DIASTOLIC BLOOD PRESSURE: 94 MMHG

## 2020-11-24 VITALS — SYSTOLIC BLOOD PRESSURE: 88 MMHG | DIASTOLIC BLOOD PRESSURE: 52 MMHG

## 2020-11-24 VITALS — DIASTOLIC BLOOD PRESSURE: 75 MMHG | SYSTOLIC BLOOD PRESSURE: 112 MMHG

## 2020-11-24 VITALS — DIASTOLIC BLOOD PRESSURE: 75 MMHG | SYSTOLIC BLOOD PRESSURE: 128 MMHG

## 2020-11-24 VITALS — DIASTOLIC BLOOD PRESSURE: 54 MMHG | SYSTOLIC BLOOD PRESSURE: 95 MMHG

## 2020-11-24 VITALS — DIASTOLIC BLOOD PRESSURE: 63 MMHG | SYSTOLIC BLOOD PRESSURE: 92 MMHG

## 2020-11-24 VITALS — DIASTOLIC BLOOD PRESSURE: 79 MMHG | SYSTOLIC BLOOD PRESSURE: 118 MMHG

## 2020-11-24 VITALS — SYSTOLIC BLOOD PRESSURE: 97 MMHG | DIASTOLIC BLOOD PRESSURE: 72 MMHG

## 2020-11-24 VITALS — SYSTOLIC BLOOD PRESSURE: 105 MMHG | DIASTOLIC BLOOD PRESSURE: 78 MMHG

## 2020-11-24 VITALS — DIASTOLIC BLOOD PRESSURE: 60 MMHG | SYSTOLIC BLOOD PRESSURE: 90 MMHG

## 2020-11-24 VITALS — SYSTOLIC BLOOD PRESSURE: 93 MMHG | DIASTOLIC BLOOD PRESSURE: 60 MMHG

## 2020-11-24 VITALS — SYSTOLIC BLOOD PRESSURE: 95 MMHG | DIASTOLIC BLOOD PRESSURE: 57 MMHG

## 2020-11-24 VITALS — DIASTOLIC BLOOD PRESSURE: 65 MMHG | SYSTOLIC BLOOD PRESSURE: 100 MMHG

## 2020-11-24 VITALS — SYSTOLIC BLOOD PRESSURE: 101 MMHG | DIASTOLIC BLOOD PRESSURE: 77 MMHG

## 2020-11-24 VITALS — DIASTOLIC BLOOD PRESSURE: 64 MMHG | SYSTOLIC BLOOD PRESSURE: 102 MMHG

## 2020-11-24 VITALS — DIASTOLIC BLOOD PRESSURE: 63 MMHG | SYSTOLIC BLOOD PRESSURE: 94 MMHG

## 2020-11-24 VITALS — DIASTOLIC BLOOD PRESSURE: 77 MMHG | SYSTOLIC BLOOD PRESSURE: 122 MMHG

## 2020-11-24 VITALS — SYSTOLIC BLOOD PRESSURE: 100 MMHG | DIASTOLIC BLOOD PRESSURE: 64 MMHG

## 2020-11-24 VITALS — SYSTOLIC BLOOD PRESSURE: 119 MMHG | DIASTOLIC BLOOD PRESSURE: 75 MMHG

## 2020-11-24 VITALS — DIASTOLIC BLOOD PRESSURE: 57 MMHG | SYSTOLIC BLOOD PRESSURE: 120 MMHG

## 2020-11-24 VITALS — DIASTOLIC BLOOD PRESSURE: 68 MMHG | SYSTOLIC BLOOD PRESSURE: 107 MMHG

## 2020-11-24 VITALS — SYSTOLIC BLOOD PRESSURE: 102 MMHG | DIASTOLIC BLOOD PRESSURE: 65 MMHG

## 2020-11-24 VITALS — SYSTOLIC BLOOD PRESSURE: 98 MMHG | DIASTOLIC BLOOD PRESSURE: 79 MMHG

## 2020-11-24 VITALS — DIASTOLIC BLOOD PRESSURE: 65 MMHG | SYSTOLIC BLOOD PRESSURE: 116 MMHG

## 2020-11-24 VITALS — DIASTOLIC BLOOD PRESSURE: 71 MMHG | SYSTOLIC BLOOD PRESSURE: 100 MMHG

## 2020-11-24 LAB
ANION GAP SERPL CALC-SCNC: 8.2 MMOL/L (ref 8–16)
BASOPHILS # BLD AUTO: 0 10*3/UL (ref 0–0.1)
BASOPHILS NFR BLD AUTO: 0.2 % (ref 0–1)
BUN SERPL-MCNC: 12 MG/DL (ref 7–26)
BUN/CREAT SERPL: 27 (ref 6–25)
CALCIUM SERPL-MCNC: 7.9 MG/DL (ref 8.4–10.2)
CHLORIDE SERPL-SCNC: 107 MMOL/L (ref 98–107)
CO2 BLDCOA CALC-SCNC: 29 MMOL/L
CO2 SERPL-SCNC: 27 MMOL/L (ref 22–29)
DEPRECATED NEUTROPHILS # BLD AUTO: 6 10*3/UL (ref 2.1–6.9)
EGFRCR SERPLBLD CKD-EPI 2021: > 60 ML/MIN (ref 60–?)
EOSINOPHIL # BLD AUTO: 0.2 10*3/UL (ref 0–0.4)
EOSINOPHIL NFR BLD AUTO: 2.2 % (ref 0–6)
ERYTHROCYTE [DISTWIDTH] IN CORD BLOOD: 17.8 % (ref 11.7–14.4)
GLUCOSE SERPLBLD-MCNC: 104 MG/DL (ref 74–118)
HCO3 BLDA-SCNC: 28 MMOL/L (ref 22–26)
HCT VFR BLD AUTO: 32 % (ref 34.2–44.1)
HGB BLD-MCNC: 10 G/DL (ref 12–16)
LYMPHOCYTES # BLD: 1.2 10*3/UL (ref 1–3.2)
LYMPHOCYTES NFR BLD AUTO: 15.2 % (ref 18–39.1)
MCH RBC QN AUTO: 25.9 PG (ref 28–32)
MCHC RBC AUTO-ENTMCNC: 31.3 G/DL (ref 31–35)
MCV RBC AUTO: 82.9 FL (ref 81–99)
MONOCYTES # BLD AUTO: 0.7 10*3/UL (ref 0.2–0.8)
MONOCYTES NFR BLD AUTO: 8.7 % (ref 4.4–11.3)
NEUTS SEG NFR BLD AUTO: 73.3 % (ref 38.7–80)
PCO2 BLDA: 183 MMHG (ref 80–105)
PCO2 BLDA: 46 MMHG (ref 35–45)
PH BLDA: 7.39 [PH] (ref 7.35–7.45)
PLATELET # BLD AUTO: 100 X10E3/UL (ref 140–360)
POTASSIUM SERPL-SCNC: 3.2 MMOL/L (ref 3.5–5.1)
RBC # BLD AUTO: 3.86 X10E6/UL (ref 3.6–5.1)
SAO2 % BLDA: 100 % (ref 95–98)
SODIUM SERPL-SCNC: 139 MMOL/L (ref 136–145)

## 2020-11-24 RX ADMIN — Medication PRN MG: at 03:47

## 2020-11-24 RX ADMIN — CEFEPIME HYDROCHLORIDE SCH MLS/HR: 1 INJECTION, POWDER, FOR SOLUTION INTRAMUSCULAR; INTRAVENOUS at 21:47

## 2020-11-24 RX ADMIN — IPRATROPIUM BROMIDE AND ALBUTEROL SULFATE SCH ML: .5; 2.5 SOLUTION RESPIRATORY (INHALATION) at 07:25

## 2020-11-24 RX ADMIN — ETHAMBUTOL HYDROCHLORIDE SCH MG: 400 TABLET, FILM COATED ORAL at 08:09

## 2020-11-24 RX ADMIN — IPRATROPIUM BROMIDE AND ALBUTEROL SULFATE SCH ML: .5; 2.5 SOLUTION RESPIRATORY (INHALATION) at 19:27

## 2020-11-24 RX ADMIN — IPRATROPIUM BROMIDE AND ALBUTEROL SULFATE SCH ML: .5; 2.5 SOLUTION RESPIRATORY (INHALATION) at 14:15

## 2020-11-24 RX ADMIN — CEFEPIME HYDROCHLORIDE SCH MLS/HR: 1 INJECTION, POWDER, FOR SOLUTION INTRAMUSCULAR; INTRAVENOUS at 09:11

## 2020-11-24 RX ADMIN — CASTOR OIL AND BALSAM, PERU SCH GM: 788; 87 OINTMENT TOPICAL at 08:09

## 2020-11-24 RX ADMIN — RIFAMPIN SCH MG: 300 CAPSULE ORAL at 08:09

## 2020-11-24 RX ADMIN — SODIUM CHLORIDE SCH MG: 900 INJECTION INTRAVENOUS at 08:09

## 2020-11-24 RX ADMIN — Medication PRN MG: at 09:18

## 2020-11-24 RX ADMIN — AZITHROMYCIN SCH MG: 250 TABLET, FILM COATED ORAL at 08:09

## 2020-11-24 RX ADMIN — PROPOFOL PRN MLS/HR: 10 INJECTION, EMULSION INTRAVENOUS at 02:06

## 2020-11-24 RX ADMIN — IPRATROPIUM BROMIDE AND ALBUTEROL SULFATE SCH ML: .5; 2.5 SOLUTION RESPIRATORY (INHALATION) at 02:55

## 2020-11-25 VITALS — DIASTOLIC BLOOD PRESSURE: 47 MMHG | SYSTOLIC BLOOD PRESSURE: 80 MMHG

## 2020-11-25 VITALS — DIASTOLIC BLOOD PRESSURE: 62 MMHG | SYSTOLIC BLOOD PRESSURE: 115 MMHG

## 2020-11-25 VITALS — SYSTOLIC BLOOD PRESSURE: 118 MMHG | DIASTOLIC BLOOD PRESSURE: 66 MMHG

## 2020-11-25 VITALS — DIASTOLIC BLOOD PRESSURE: 61 MMHG | SYSTOLIC BLOOD PRESSURE: 113 MMHG

## 2020-11-25 VITALS — DIASTOLIC BLOOD PRESSURE: 62 MMHG | SYSTOLIC BLOOD PRESSURE: 118 MMHG

## 2020-11-25 VITALS — SYSTOLIC BLOOD PRESSURE: 87 MMHG | DIASTOLIC BLOOD PRESSURE: 50 MMHG

## 2020-11-25 VITALS — DIASTOLIC BLOOD PRESSURE: 61 MMHG | SYSTOLIC BLOOD PRESSURE: 110 MMHG

## 2020-11-25 VITALS — SYSTOLIC BLOOD PRESSURE: 108 MMHG | DIASTOLIC BLOOD PRESSURE: 59 MMHG

## 2020-11-25 VITALS — SYSTOLIC BLOOD PRESSURE: 74 MMHG | DIASTOLIC BLOOD PRESSURE: 43 MMHG

## 2020-11-25 VITALS — SYSTOLIC BLOOD PRESSURE: 104 MMHG | DIASTOLIC BLOOD PRESSURE: 81 MMHG

## 2020-11-25 VITALS — SYSTOLIC BLOOD PRESSURE: 119 MMHG | DIASTOLIC BLOOD PRESSURE: 72 MMHG

## 2020-11-25 VITALS — DIASTOLIC BLOOD PRESSURE: 58 MMHG | SYSTOLIC BLOOD PRESSURE: 110 MMHG

## 2020-11-25 VITALS — SYSTOLIC BLOOD PRESSURE: 116 MMHG | DIASTOLIC BLOOD PRESSURE: 61 MMHG

## 2020-11-25 VITALS — DIASTOLIC BLOOD PRESSURE: 68 MMHG | SYSTOLIC BLOOD PRESSURE: 101 MMHG

## 2020-11-25 VITALS — DIASTOLIC BLOOD PRESSURE: 73 MMHG | SYSTOLIC BLOOD PRESSURE: 129 MMHG

## 2020-11-25 VITALS — SYSTOLIC BLOOD PRESSURE: 105 MMHG | DIASTOLIC BLOOD PRESSURE: 57 MMHG

## 2020-11-25 VITALS — SYSTOLIC BLOOD PRESSURE: 66 MMHG | DIASTOLIC BLOOD PRESSURE: 41 MMHG

## 2020-11-25 VITALS — DIASTOLIC BLOOD PRESSURE: 63 MMHG | SYSTOLIC BLOOD PRESSURE: 114 MMHG

## 2020-11-25 VITALS — SYSTOLIC BLOOD PRESSURE: 116 MMHG | DIASTOLIC BLOOD PRESSURE: 63 MMHG

## 2020-11-25 VITALS — DIASTOLIC BLOOD PRESSURE: 58 MMHG | SYSTOLIC BLOOD PRESSURE: 105 MMHG

## 2020-11-25 VITALS — SYSTOLIC BLOOD PRESSURE: 83 MMHG | DIASTOLIC BLOOD PRESSURE: 47 MMHG

## 2020-11-25 VITALS — DIASTOLIC BLOOD PRESSURE: 67 MMHG | SYSTOLIC BLOOD PRESSURE: 113 MMHG

## 2020-11-25 VITALS — DIASTOLIC BLOOD PRESSURE: 51 MMHG | SYSTOLIC BLOOD PRESSURE: 98 MMHG

## 2020-11-25 VITALS — DIASTOLIC BLOOD PRESSURE: 59 MMHG | SYSTOLIC BLOOD PRESSURE: 108 MMHG

## 2020-11-25 LAB
ANION GAP SERPL CALC-SCNC: 12.5 MMOL/L (ref 8–16)
BASOPHILS # BLD AUTO: 0.1 10*3/UL (ref 0–0.1)
BASOPHILS NFR BLD AUTO: 0.4 % (ref 0–1)
BUN SERPL-MCNC: 15 MG/DL (ref 7–26)
BUN/CREAT SERPL: 31 (ref 6–25)
CALCIUM SERPL-MCNC: 9.2 MG/DL (ref 8.4–10.2)
CHLORIDE SERPL-SCNC: 106 MMOL/L (ref 98–107)
CO2 SERPL-SCNC: 25 MMOL/L (ref 22–29)
DEPRECATED NEUTROPHILS # BLD AUTO: 9 10*3/UL (ref 2.1–6.9)
EGFRCR SERPLBLD CKD-EPI 2021: > 60 ML/MIN (ref 60–?)
EOSINOPHIL # BLD AUTO: 0.1 10*3/UL (ref 0–0.4)
EOSINOPHIL NFR BLD AUTO: 0.5 % (ref 0–6)
ERYTHROCYTE [DISTWIDTH] IN CORD BLOOD: 18.1 % (ref 11.7–14.4)
GLUCOSE SERPLBLD-MCNC: 88 MG/DL (ref 74–118)
HCT VFR BLD AUTO: 40.9 % (ref 34.2–44.1)
HGB BLD-MCNC: 11.9 G/DL (ref 12–16)
LYMPHOCYTES # BLD: 1.4 10*3/UL (ref 1–3.2)
LYMPHOCYTES NFR BLD AUTO: 11.9 % (ref 18–39.1)
LYMPHOCYTES NFR BLD MANUAL: 16 % (ref 19–48)
MCH RBC QN AUTO: 26 PG (ref 28–32)
MCHC RBC AUTO-ENTMCNC: 29.1 G/DL (ref 31–35)
MCV RBC AUTO: 89.3 FL (ref 81–99)
MONOCYTES # BLD AUTO: 1.1 10*3/UL (ref 0.2–0.8)
MONOCYTES NFR BLD AUTO: 9.7 % (ref 4.4–11.3)
MONOCYTES NFR BLD MANUAL: 14 % (ref 3.4–9)
NEUTS BAND NFR BLD MANUAL: 7 %
NEUTS SEG NFR BLD AUTO: 76.8 % (ref 38.7–80)
NEUTS SEG NFR BLD MANUAL: 63 % (ref 40–74)
PLAT MORPH BLD: (no result)
PLATELET # BLD AUTO: 62 X10E3/UL (ref 140–360)
PLATELET # BLD EST: (no result) 10*3/UL
POTASSIUM SERPL-SCNC: 4.5 MMOL/L (ref 3.5–5.1)
RBC # BLD AUTO: 4.58 X10E6/UL (ref 3.6–5.1)
RBC MORPH BLD: NORMAL
SODIUM SERPL-SCNC: 139 MMOL/L (ref 136–145)

## 2020-11-25 RX ADMIN — IPRATROPIUM BROMIDE AND ALBUTEROL SULFATE SCH ML: .5; 2.5 SOLUTION RESPIRATORY (INHALATION) at 07:30

## 2020-11-25 RX ADMIN — CASTOR OIL AND BALSAM, PERU SCH GM: 788; 87 OINTMENT TOPICAL at 09:04

## 2020-11-25 RX ADMIN — ETHAMBUTOL HYDROCHLORIDE SCH MG: 400 TABLET, FILM COATED ORAL at 09:04

## 2020-11-25 RX ADMIN — AZITHROMYCIN SCH MG: 250 TABLET, FILM COATED ORAL at 09:04

## 2020-11-25 RX ADMIN — IPRATROPIUM BROMIDE AND ALBUTEROL SULFATE SCH ML: .5; 2.5 SOLUTION RESPIRATORY (INHALATION) at 01:30

## 2020-11-25 RX ADMIN — RIFAMPIN SCH MG: 300 CAPSULE ORAL at 09:04

## 2020-11-25 RX ADMIN — IPRATROPIUM BROMIDE AND ALBUTEROL SULFATE SCH ML: .5; 2.5 SOLUTION RESPIRATORY (INHALATION) at 13:30

## 2020-11-25 RX ADMIN — SODIUM CHLORIDE SCH MG: 900 INJECTION INTRAVENOUS at 09:02

## 2020-11-25 RX ADMIN — CEFEPIME HYDROCHLORIDE SCH MLS/HR: 1 INJECTION, POWDER, FOR SOLUTION INTRAMUSCULAR; INTRAVENOUS at 09:03
